# Patient Record
Sex: FEMALE | Race: WHITE | HISPANIC OR LATINO | Employment: FULL TIME | URBAN - METROPOLITAN AREA
[De-identification: names, ages, dates, MRNs, and addresses within clinical notes are randomized per-mention and may not be internally consistent; named-entity substitution may affect disease eponyms.]

---

## 2018-09-05 ENCOUNTER — OFFICE VISIT (OUTPATIENT)
Dept: MEDICAL GROUP | Facility: MEDICAL CENTER | Age: 38
End: 2018-09-05
Payer: COMMERCIAL

## 2018-09-05 ENCOUNTER — HOSPITAL ENCOUNTER (OUTPATIENT)
Facility: MEDICAL CENTER | Age: 38
End: 2018-09-05
Attending: PHYSICIAN ASSISTANT
Payer: COMMERCIAL

## 2018-09-05 VITALS
HEIGHT: 62 IN | DIASTOLIC BLOOD PRESSURE: 62 MMHG | SYSTOLIC BLOOD PRESSURE: 112 MMHG | BODY MASS INDEX: 25.96 KG/M2 | HEART RATE: 83 BPM | OXYGEN SATURATION: 97 % | WEIGHT: 141.09 LBS | RESPIRATION RATE: 16 BRPM | TEMPERATURE: 98.4 F

## 2018-09-05 DIAGNOSIS — N64.52 BREAST DISCHARGE: ICD-10-CM

## 2018-09-05 DIAGNOSIS — M54.50 ACUTE BILATERAL LOW BACK PAIN WITHOUT SCIATICA: ICD-10-CM

## 2018-09-05 DIAGNOSIS — N92.6 ABNORMAL MENSTRUAL PERIODS: ICD-10-CM

## 2018-09-05 DIAGNOSIS — Z00.00 WELLNESS EXAMINATION: ICD-10-CM

## 2018-09-05 DIAGNOSIS — G44.229 CHRONIC TENSION-TYPE HEADACHE, NOT INTRACTABLE: ICD-10-CM

## 2018-09-05 DIAGNOSIS — Z13.6 SCREENING FOR CARDIOVASCULAR CONDITION: ICD-10-CM

## 2018-09-05 DIAGNOSIS — Z12.4 SCREENING FOR CERVICAL CANCER: ICD-10-CM

## 2018-09-05 PROCEDURE — 87624 HPV HI-RISK TYP POOLED RSLT: CPT

## 2018-09-05 PROCEDURE — 99395 PREV VISIT EST AGE 18-39: CPT | Performed by: PHYSICIAN ASSISTANT

## 2018-09-05 PROCEDURE — 88175 CYTOPATH C/V AUTO FLUID REDO: CPT

## 2018-09-05 RX ORDER — ELECTROLYTES/DEXTROSE
SOLUTION, ORAL ORAL
COMMUNITY
End: 2020-09-23

## 2018-09-05 ASSESSMENT — PATIENT HEALTH QUESTIONNAIRE - PHQ9: CLINICAL INTERPRETATION OF PHQ2 SCORE: 0

## 2018-09-05 NOTE — PROGRESS NOTES
SUBJECTIVE: 37 y.o. female for annual routine pap and checkup.  Chief Complaint   Patient presents with   • Establish Care     PAP       , 14 and 4 y/o. Boyfriend had vasectomy. Patient not on birth control  Last Pap: 4 years ago   Contraception: none  H/O Abnormal Pap: one ASCUS, had biopsy, normal and then normal after that  Current partner: monogamous, boyfriend    Abnormal periods for 6 months. No known cause. Not really heavy or painful. No infertility.    LMP: Patient's last menstrual period was 2018.    Allergies: Patient has no known allergies.     ROS:    No pelvic pain, or dyspareunia. No vaginal discharge   No breast tenderness, mass, changes in size or contour, or abnormal cyclic discomfort. No leaking of milk but can still express it 3.5 years after she stopped nursing son  No urinary tract symptoms, no incontinence.   No abdominal pain, change in bowel habits, black or bloody stools.     No prolonged cough. No dyspnea or chest pain on exertion.    No skin lesions, concerns.   Episode 2 weeks ago of low back pain that lasted about 3 weeks, not sure of cause  Gets headaches chronically - bilat temples, about twice a week, not associated with nausea, vision change, dizziness. Had normal vision exam and eye exam this year. Does get a lot of neck tension. Works as dental assistant.    Preventive Care:      Current Outpatient Prescriptions   Medication Sig Dispense Refill   • Multiple Vitamins-Minerals (MULTIVITAMIN ADULT) Tab Take  by mouth.       No current facility-administered medications for this visit.      She  has no past medical history on file.  She  has no past surgical history on file.     Family History:   Family History   Problem Relation Age of Onset   • Diabetes Mother    • Diabetes Maternal Grandfather    • Cancer Neg Hx    • Heart Attack Neg Hx    • Heart Disease Neg Hx    • Stroke Neg Hx        Family History negative for : Breast, Colon, Lung, or female organ cancer, thyroid  "disease, CAD,osteoporosis.     OBJECTIVE:   /62   Pulse 83   Temp 36.9 °C (98.4 °F)   Resp 16   Ht 1.575 m (5' 2\")   Wt 64 kg (141 lb 1.5 oz)   LMP 07/01/2018   SpO2 97%   Breastfeeding? No   BMI 25.81 kg/m²   Body mass index is 25.81 kg/m².      HEAD AND NECK:  Ears normal.  Throat, oral cavity and tongue normal.  Neck supple. No adenopathy or masses in the neck or supraclavicular regions.  No carotid bruits. No thyromegaly. NEURO: Cranial nerves are normal. DTR's normal and symmetric.  CHEST:  Clear, good air entry, no wheezes or rales. HEART:  Regular rate and rhythm.  S1 and S2 normal.  No edema or JVD. ABDOMEN:  Soft without tenderness, guarding, mass or organomegaly.  No CVA tenderness or inguinal adenopathy. EXTREMITIES:  Extremities, reflexes and peripheral pulses are normal.  SKIN:  No rashes or suspicious skin lesions noted.     Breast Exam: Performed with instruction during examination. No axillary lymphadenopathy, no skin changes, no dominant masses. No nipple retraction  Pelvic Exam - Sure Path Pap obtained and specimen sent to lab. Normal external genitalia with no lesions. Normal vaginal mucosa with normal rugation and scant vaginal discharge. Cervix with no visible lesions. No cervical motion tenderness. Uterus is normal sized with no masses. No adnexal tenderness or enlargement appreciated.      <ASSESSMENT>  1. Wellness examination  CBC WITH DIFFERENTIAL    COMP METABOLIC PANEL    TSH WITH REFLEX TO FT4   2. Abnormal menstrual periods  LUTEINIZING HORMONE SERUM    FSH    PROLACTIN   3. Acute bilateral low back pain without sciatica  URINALYSIS,CULTURE IF INDICATED   4. Breast discharge  PROLACTIN   5. Chronic tension-type headache, not intractable  REFERRAL TO PHYSICAL THERAPY Reason for Therapy: Eval/Treat/Report   6. Screening for cardiovascular condition  LIPID PROFILE   7. Screening for cervical cancer  THINPREP PAP WITH HPV     F/u appt after labs       "

## 2018-09-05 NOTE — ASSESSMENT & PLAN NOTE
Chronic, twice a week, both sides of head, lasts a day - no associated dizziness, nausea, vision change. Ibuprofen helped. Had eyes checked 2-3 months ago. Pounding type headache. Does get neck tension

## 2018-09-05 NOTE — ASSESSMENT & PLAN NOTE
Lasted 3 weeks, better about a week ago. Low back, deep, worse when going from bending forward to sitting up straight. No injury. No new bed/mattress. Works as dental assistant. No rash, no urine symptoms, no fever. No meds tried.

## 2018-09-08 DIAGNOSIS — Z12.4 SCREENING FOR CERVICAL CANCER: ICD-10-CM

## 2018-09-08 LAB — AMBIGUOUS DTTM AMBI4: NORMAL

## 2018-09-10 LAB
CYTOLOGY REG CYTOL: NORMAL
HPV HR 12 DNA CVX QL NAA+PROBE: NEGATIVE
HPV16 DNA SPEC QL NAA+PROBE: NEGATIVE
HPV18 DNA SPEC QL NAA+PROBE: NEGATIVE
SPECIMEN SOURCE: NORMAL

## 2018-09-11 ENCOUNTER — NON-PROVIDER VISIT (OUTPATIENT)
Dept: OCCUPATIONAL MEDICINE | Facility: CLINIC | Age: 38
End: 2018-09-11

## 2018-09-11 ENCOUNTER — HOSPITAL ENCOUNTER (OUTPATIENT)
Facility: MEDICAL CENTER | Age: 38
End: 2018-09-11
Attending: PREVENTIVE MEDICINE
Payer: COMMERCIAL

## 2018-09-11 ENCOUNTER — APPOINTMENT (OUTPATIENT)
Dept: PHYSICAL THERAPY | Facility: REHABILITATION | Age: 38
End: 2018-09-11
Attending: PHYSICIAN ASSISTANT
Payer: COMMERCIAL

## 2018-09-11 DIAGNOSIS — Z02.89 VISIT FOR OCCUPATIONAL HEALTH EXAMINATION: Primary | ICD-10-CM

## 2018-09-11 DIAGNOSIS — Z02.89 VISIT FOR OCCUPATIONAL HEALTH EXAMINATION: ICD-10-CM

## 2018-09-11 PROCEDURE — 86480 TB TEST CELL IMMUN MEASURE: CPT | Performed by: PREVENTIVE MEDICINE

## 2018-09-12 ENCOUNTER — TELEPHONE (OUTPATIENT)
Dept: MEDICAL GROUP | Facility: MEDICAL CENTER | Age: 38
End: 2018-09-12

## 2018-09-12 NOTE — TELEPHONE ENCOUNTER
----- Message from Michell Pires P.A.-C. sent at 9/12/2018  1:24 PM PDT -----  Please call patient and advise labs reviewed and normal, next screening PAP in 5 years, still needs yearly annual visit. Thanks! Michell Pires PA-C

## 2018-09-13 ENCOUNTER — APPOINTMENT (OUTPATIENT)
Dept: PHYSICAL THERAPY | Facility: REHABILITATION | Age: 38
End: 2018-09-13
Attending: PHYSICIAN ASSISTANT
Payer: COMMERCIAL

## 2018-09-14 LAB
M TB TUBERC IFN-G BLD QL: NEGATIVE
M TB TUBERC IFN-G/MITOGEN IGNF BLD: -0
M TB TUBERC IGNF/MITOGEN IGNF CONTROL: 49.08 [IU]/ML
MITOGEN IGNF BCKGRD COR BLD-ACNC: 0.03 [IU]/ML

## 2018-09-17 ENCOUNTER — APPOINTMENT (OUTPATIENT)
Dept: PHYSICAL THERAPY | Facility: REHABILITATION | Age: 38
End: 2018-09-17
Attending: PHYSICIAN ASSISTANT
Payer: COMMERCIAL

## 2018-09-21 ENCOUNTER — APPOINTMENT (OUTPATIENT)
Dept: PHYSICAL THERAPY | Facility: REHABILITATION | Age: 38
End: 2018-09-21
Attending: PHYSICIAN ASSISTANT
Payer: COMMERCIAL

## 2018-09-25 ENCOUNTER — APPOINTMENT (OUTPATIENT)
Dept: PHYSICAL THERAPY | Facility: REHABILITATION | Age: 38
End: 2018-09-25
Attending: PHYSICIAN ASSISTANT
Payer: COMMERCIAL

## 2018-09-28 ENCOUNTER — APPOINTMENT (OUTPATIENT)
Dept: PHYSICAL THERAPY | Facility: REHABILITATION | Age: 38
End: 2018-09-28
Attending: PHYSICIAN ASSISTANT
Payer: COMMERCIAL

## 2018-10-02 ENCOUNTER — APPOINTMENT (OUTPATIENT)
Dept: PHYSICAL THERAPY | Facility: REHABILITATION | Age: 38
End: 2018-10-02
Attending: PHYSICIAN ASSISTANT
Payer: MEDICAID

## 2018-10-04 ENCOUNTER — APPOINTMENT (OUTPATIENT)
Dept: PHYSICAL THERAPY | Facility: REHABILITATION | Age: 38
End: 2018-10-04
Attending: PHYSICIAN ASSISTANT
Payer: MEDICAID

## 2018-10-08 ENCOUNTER — APPOINTMENT (OUTPATIENT)
Dept: PHYSICAL THERAPY | Facility: REHABILITATION | Age: 38
End: 2018-10-08
Attending: PHYSICIAN ASSISTANT
Payer: MEDICAID

## 2018-10-11 ENCOUNTER — APPOINTMENT (OUTPATIENT)
Dept: PHYSICAL THERAPY | Facility: REHABILITATION | Age: 38
End: 2018-10-11
Attending: PHYSICIAN ASSISTANT
Payer: MEDICAID

## 2018-11-05 ENCOUNTER — HOSPITAL ENCOUNTER (OUTPATIENT)
Dept: LAB | Facility: MEDICAL CENTER | Age: 38
End: 2018-11-05
Attending: PHYSICIAN ASSISTANT
Payer: COMMERCIAL

## 2018-11-05 DIAGNOSIS — Z00.00 WELLNESS EXAMINATION: ICD-10-CM

## 2018-11-05 DIAGNOSIS — Z13.6 SCREENING FOR CARDIOVASCULAR CONDITION: ICD-10-CM

## 2018-11-05 DIAGNOSIS — N92.6 ABNORMAL MENSTRUAL PERIODS: ICD-10-CM

## 2018-11-05 DIAGNOSIS — N64.52 BREAST DISCHARGE: ICD-10-CM

## 2018-11-05 DIAGNOSIS — M54.50 ACUTE BILATERAL LOW BACK PAIN WITHOUT SCIATICA: ICD-10-CM

## 2018-11-05 LAB
ALBUMIN SERPL BCP-MCNC: 4.7 G/DL (ref 3.2–4.9)
ALBUMIN/GLOB SERPL: 1.8 G/DL
ALP SERPL-CCNC: 68 U/L (ref 30–99)
ALT SERPL-CCNC: 45 U/L (ref 2–50)
ANION GAP SERPL CALC-SCNC: 9 MMOL/L (ref 0–11.9)
APPEARANCE UR: CLEAR
AST SERPL-CCNC: 30 U/L (ref 12–45)
BACTERIA #/AREA URNS HPF: ABNORMAL /HPF
BASOPHILS # BLD AUTO: 0.6 % (ref 0–1.8)
BASOPHILS # BLD: 0.05 K/UL (ref 0–0.12)
BILIRUB SERPL-MCNC: 1.2 MG/DL (ref 0.1–1.5)
BILIRUB UR QL STRIP.AUTO: NEGATIVE
BUN SERPL-MCNC: 13 MG/DL (ref 8–22)
CALCIUM SERPL-MCNC: 9.7 MG/DL (ref 8.5–10.5)
CHLORIDE SERPL-SCNC: 107 MMOL/L (ref 96–112)
CHOLEST SERPL-MCNC: 164 MG/DL (ref 100–199)
CO2 SERPL-SCNC: 24 MMOL/L (ref 20–33)
COLOR UR: YELLOW
CREAT SERPL-MCNC: 0.76 MG/DL (ref 0.5–1.4)
EOSINOPHIL # BLD AUTO: 0.08 K/UL (ref 0–0.51)
EOSINOPHIL NFR BLD: 1 % (ref 0–6.9)
EPI CELLS #/AREA URNS HPF: ABNORMAL /HPF
ERYTHROCYTE [DISTWIDTH] IN BLOOD BY AUTOMATED COUNT: 42.9 FL (ref 35.9–50)
FASTING STATUS PATIENT QL REPORTED: NORMAL
FSH SERPL-ACNC: 5.6 MIU/ML
GLOBULIN SER CALC-MCNC: 2.6 G/DL (ref 1.9–3.5)
GLUCOSE SERPL-MCNC: 89 MG/DL (ref 65–99)
GLUCOSE UR STRIP.AUTO-MCNC: NEGATIVE MG/DL
HCT VFR BLD AUTO: 42.5 % (ref 37–47)
HDLC SERPL-MCNC: 61 MG/DL
HGB BLD-MCNC: 14.3 G/DL (ref 12–16)
HYALINE CASTS #/AREA URNS LPF: ABNORMAL /LPF
IMM GRANULOCYTES # BLD AUTO: 0.02 K/UL (ref 0–0.11)
IMM GRANULOCYTES NFR BLD AUTO: 0.3 % (ref 0–0.9)
KETONES UR STRIP.AUTO-MCNC: ABNORMAL MG/DL
LDLC SERPL CALC-MCNC: 93 MG/DL
LEUKOCYTE ESTERASE UR QL STRIP.AUTO: ABNORMAL
LH SERPL-ACNC: 3 IU/L
LYMPHOCYTES # BLD AUTO: 2.16 K/UL (ref 1–4.8)
LYMPHOCYTES NFR BLD: 27.6 % (ref 22–41)
MCH RBC QN AUTO: 29.7 PG (ref 27–33)
MCHC RBC AUTO-ENTMCNC: 33.6 G/DL (ref 33.6–35)
MCV RBC AUTO: 88.2 FL (ref 81.4–97.8)
MICRO URNS: ABNORMAL
MONOCYTES # BLD AUTO: 0.47 K/UL (ref 0–0.85)
MONOCYTES NFR BLD AUTO: 6 % (ref 0–13.4)
NEUTROPHILS # BLD AUTO: 5.05 K/UL (ref 2–7.15)
NEUTROPHILS NFR BLD: 64.5 % (ref 44–72)
NITRITE UR QL STRIP.AUTO: NEGATIVE
NRBC # BLD AUTO: 0 K/UL
NRBC BLD-RTO: 0 /100 WBC
PH UR STRIP.AUTO: 6 [PH]
PLATELET # BLD AUTO: 353 K/UL (ref 164–446)
PMV BLD AUTO: 9.1 FL (ref 9–12.9)
POTASSIUM SERPL-SCNC: 4 MMOL/L (ref 3.6–5.5)
PROLACTIN SERPL-MCNC: 78.52 NG/ML (ref 2.8–26)
PROT SERPL-MCNC: 7.3 G/DL (ref 6–8.2)
PROT UR QL STRIP: NEGATIVE MG/DL
RBC # BLD AUTO: 4.82 M/UL (ref 4.2–5.4)
RBC # URNS HPF: ABNORMAL /HPF
RBC UR QL AUTO: ABNORMAL
SODIUM SERPL-SCNC: 140 MMOL/L (ref 135–145)
SP GR UR STRIP.AUTO: 1.02
TRIGL SERPL-MCNC: 48 MG/DL (ref 0–149)
TSH SERPL DL<=0.005 MIU/L-ACNC: 2.6 UIU/ML (ref 0.38–5.33)
UROBILINOGEN UR STRIP.AUTO-MCNC: 0.2 MG/DL
WBC # BLD AUTO: 7.8 K/UL (ref 4.8–10.8)
WBC #/AREA URNS HPF: ABNORMAL /HPF

## 2018-11-05 PROCEDURE — 84443 ASSAY THYROID STIM HORMONE: CPT

## 2018-11-05 PROCEDURE — 83002 ASSAY OF GONADOTROPIN (LH): CPT

## 2018-11-05 PROCEDURE — 83001 ASSAY OF GONADOTROPIN (FSH): CPT

## 2018-11-05 PROCEDURE — 80061 LIPID PANEL: CPT

## 2018-11-05 PROCEDURE — 85025 COMPLETE CBC W/AUTO DIFF WBC: CPT

## 2018-11-05 PROCEDURE — 81001 URINALYSIS AUTO W/SCOPE: CPT

## 2018-11-05 PROCEDURE — 36415 COLL VENOUS BLD VENIPUNCTURE: CPT

## 2018-11-05 PROCEDURE — 84146 ASSAY OF PROLACTIN: CPT

## 2018-11-05 PROCEDURE — 80053 COMPREHEN METABOLIC PANEL: CPT

## 2018-11-08 ENCOUNTER — SUPERVISING PHYSICIAN REVIEW (OUTPATIENT)
Dept: MEDICAL GROUP | Facility: MEDICAL CENTER | Age: 38
End: 2018-11-08

## 2018-11-08 NOTE — PROGRESS NOTES
Supervising Physician Review    Reviewed labs showing hyperprolactinemia in setting of irregular periods.     Recommend repeating serum prolactin and obtaining serum insulin-like growth factor-1 (IGF-1), serum ACTH. If prolactin remains elevated, recommend pituitary MRI and referral to endocrinology.

## 2018-11-27 ENCOUNTER — TELEPHONE (OUTPATIENT)
Dept: MEDICAL GROUP | Facility: MEDICAL CENTER | Age: 38
End: 2018-11-27

## 2018-11-27 DIAGNOSIS — E22.1 HYPERPROLACTINEMIA (HCC): ICD-10-CM

## 2018-11-27 NOTE — TELEPHONE ENCOUNTER
Phone Number Called: [phone] (home)     Message: LVM for pt. To call back for lab results.    Left Message for patient to call back: yes

## 2018-11-29 NOTE — TELEPHONE ENCOUNTER
Phone Number Called: [phone] (home)     Message: Spoke with pt and informed her of her test results and to get the labs that Michell ordered done. She verbalized her understanding.    Left Message for patient to call back: N\A

## 2019-02-21 ENCOUNTER — OCCUPATIONAL MEDICINE (OUTPATIENT)
Dept: URGENT CARE | Facility: CLINIC | Age: 39
End: 2019-02-21
Payer: COMMERCIAL

## 2019-02-21 VITALS
OXYGEN SATURATION: 97 % | BODY MASS INDEX: 26.68 KG/M2 | RESPIRATION RATE: 14 BRPM | HEART RATE: 70 BPM | HEIGHT: 62 IN | SYSTOLIC BLOOD PRESSURE: 116 MMHG | DIASTOLIC BLOOD PRESSURE: 76 MMHG | TEMPERATURE: 96.9 F | WEIGHT: 145 LBS

## 2019-02-21 DIAGNOSIS — M25.522 LEFT ELBOW PAIN: Primary | ICD-10-CM

## 2019-02-21 DIAGNOSIS — M25.552 ACUTE HIP PAIN, LEFT: ICD-10-CM
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
## 2019-02-21 PROCEDURE — 99214 OFFICE O/P EST MOD 30 MIN: CPT | Mod: 29 | Performed by: NURSE PRACTITIONER

## 2019-02-21 RX ORDER — IBUPROFEN 200 MG
600 TABLET ORAL ONCE
Status: COMPLETED | OUTPATIENT
Start: 2019-02-21 | End: 2019-02-21

## 2019-02-21 RX ADMIN — Medication 600 MG: at 09:25

## 2019-02-21 ASSESSMENT — ENCOUNTER SYMPTOMS
WEAKNESS: 0
FEVER: 0
FALLS: 1
MYALGIAS: 1
TINGLING: 0
BRUISES/BLEEDS EASILY: 0
CHILLS: 0
SENSORY CHANGE: 0

## 2019-02-21 NOTE — LETTER
"EMPLOYEE’S CLAIM FOR COMPENSATION/ REPORT OF INITIAL TREATMENT  FORM C-4    EMPLOYEE’S CLAIM - PROVIDE ALL INFORMATION REQUESTED   First Name  Yun Last Name  Tyree Birthdate                    1980                Sex  female Claim Number   Home Address  [address] Age  38 y.o. 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 (if applicable)  Walking in the parking lot going to work    How did this injury or occupational disease occur? (Be specific an answer in detail. Use additional sheet if necessary)  Going to work this morning parking lot was very slippery and I fell I landed on my left side.   If you believe that you have an occupational disease, when did you first have knowledge of the disability and it relationship to your employment?  n/a Witnesses to the Accident  N/A      Nature of Injury or Occupational Disease  Strain  Part(s) of Body Injured or Affected  Elbow (L), N/A, N/A    I certify that the above is true and correct to the best of my knowledge and that I have provided this information in order to obtain the benefits " of [place]  I hereby authorize any physician, chiropractor, surgeon, practitioner, or other person, any hospital, including [facility] or [facility], any medical service organization, any insurance company, or other institution or organization to release to each other, any medical or other information, including benefits paid or payable, pertinent to this injury or disease, except information relative to diagnosis, treatment and/or counseling for AIDS, psychological conditions, alcohol or controlled substances, for which I must give specific authorization.  A Photostat of this authorization shall be as valid as the original.     Date   Place   Employee’s Signature   THIS REPORT MUST BE COMPLETED AND MAILED WITHIN 3 WORKING DAYS OF TREATMENT   Place  [facility]  Name of [facility]   Date  2/21/2019 Diagnosis  (M25.522) Left elbow pain  (primary encounter diagnosis)  (W00.9XXA) Fall due to slipping on ice or snow, initial encounter  (M25.552) Acute hip pain, left Is there evidence the injured employee was under the influence of alcohol and/or another controlled substance at the time of accident?   Hour  8:56 AM Description of Injury or Disease  The primary encounter diagnosis was Left elbow pain. Diagnoses of Fall due to slipping on ice or snow, initial encounter and Acute hip pain, left were also pertinent to this visit. No   Treatment  May use up to Ibuprofen 600 mg every 4-6 hrs for pain/swelling. May use ice application prn for throbbing pain/swelling. Use of ace wrap to left elbow. Limit push/pull movement to 4 hrs or less with left arm. Recheck on 2/25/19.    Have you advised the patient to remain off work five days or more? No   X-Ray Findings      If Yes   From Date  To Date      From information given by the employee, together with medical evidence, can you  "directly connect this injury or occupational disease as job incurred?    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  Yes If Modified Duty, Specify any Limitations / Restrictions  No push/pull with left arm > 4 hrs, no carry/lift > 10#.   Do you know of any previous injury or disease contributing to this condition or occupational disease?                            No   Date  2/21/2019 Print Doctor’s Name CAESAR Gomez I certify the employer’s copy of  this form was mailed on:   Address  [address] Insurer’s Use Only     [facility] Zip  [address]    Provider’s Tax ID Number  [id number] Telephone  Dept: [phone]        e-ISAÍAS Verduzco   e-Signature: Dr. Silvio Boo, Medical Director Degree  APRN        ORIGINAL-TREATING PHYSICIAN OR CHIROPRACTOR    PAGE 2-INSURER/TPA    PAGE 3-EMPLOYER    PAGE 4-EMPLOYEE             Form C-4 (rev10/07)              BRIEF DESCRIPTION OF RIGHTS AND BENEFITS  (Pursuant to [place] 616[place].050)    Notice of Injury or Occupational Disease (Incident Report Form C-1): If an injury or occupational disease (OD) arises out of and in the  course of employment, you must provide written notice to your employer as soon as practicable, but no later than 7 days after the accident or  OD. Your employer shall maintain a sufficient supply of the required forms.    Claim for Compensation (Form C-4): If medical treatment is sought, the form C-4 is available at the place of initial treatment. A completed  \"Claim for Compensation\" (Form C-4) must be filed within 90 days after an accident or OD. The treating physician or chiropractor must,  within 3 working days after treatment, complete and mail to the employer, the employer's insurer and third-party , the Claim for  Compensation.    Medical Treatment: If you require medical treatment for your " on-the-job injury or OD, you may be required to select a physician or  chiropractor from a list provided by your workers’ compensation insurer, if it has contracted with an Organization for Managed Care (MCO) or  Preferred Provider Organization (PPO) or providers of health care. If your employer has not entered into a contract with an MCO or PPO, you  may select a physician or chiropractor from the Panel of Physicians and Chiropractors. Any medical costs related to your industrial injury or  OD will be paid by your insurer.    Temporary Total Disability (TTD): If your doctor has certified that you are unable to work for a period of at least 5 consecutive days, or 5  cumulative days in a 20-day period, or places restrictions on you that your employer does not accommodate, you may be entitled to TTD  compensation.    Temporary Partial Disability (TPD): If the wage you receive upon reemployment is less than the compensation for TTD to which you are  entitled, the insurer may be required to pay you TPD compensation to make up the difference. TPD can only be paid for a maximum of 24  months.    Permanent Partial Disability (PPD): When your medical condition is stable and there is an indication of a PPD as a result of your injury or  OD, within 30 days, your insurer must arrange for an evaluation by a rating physician or chiropractor to determine the degree of your PPD. The  amount of your PPD award depends on the date of injury, the results of the PPD evaluation and your age and wage.    Permanent Total Disability (PTD): If you are medically certified by a treating physician or chiropractor as permanently and totally disabled  and have been granted a PTD status by your insurer, you are entitled to receive monthly benefits not to exceed 66 2/3% of your average  monthly wage. The amount of your PTD payments is subject to reduction if you previously received a PPD award.    Vocational Rehabilitation Services: You may be  eligible for vocational rehabilitation services if you are unable to return to the job due to a  permanent physical impairment or permanent restrictions as a result of your injury or occupational disease.    Transportation and Per Tanesha Reimbursement: You may be eligible for travel expenses and per tanesha associated with medical treatment.    Reopening: You may be able to reopen your claim if your condition worsens after claim closure.    Appeal Process: If you disagree with a written determination issued by the insurer or the insurer does not respond to your request, you may  appeal to the Department of Administration, , by following the instructions contained in your determination letter. You must  appeal the determination within 70 days from the date of the determination letter at [address], [address], [place], [place]  [address], or [address] [facility] [facility] [address], [place], [place] [address]. If you disagree with the  decision, you may appeal to the  Department of Administration, . You must file your appeal within 30 days from the date of the  decision  letter at [address], [address], [place], [place] [address], or [address] [facility] [address], [address], [place], [place] [address]. If you  disagree with a decision of an , you may file a petition for judicial review with the District Court. You must do so within 30  days of the Appeal Officer’s decision. You may be represented by an  at your own expense or you may contact the [facility] for possible  representation.    [place]  for Injured Workers (NAIW): If you disagree with a  decision, you may request that NAIW represent you  without charge at an  Hearing. For information regarding denial of benefits, you may contact the [facility] at: [address], [address], [place] [address], [phone], or [address] [facility] [address],  [place] [address], [phone]    To File a Complaint with the Division: If you wish to file a complaint with the  of the Division of Industrial Relations (DIR),  please contact the Workers’ Compensation Section, [address] [place] [address], [place], [place] [address], telephone [phone], or  [address], [address], [place], [place] [address], telephone [phone].    For assistance with Workers’ Compensation Issues: you may contact the Office of the Governor Consumer Health Assistance, [address] [place] [address], [address], [place], [place] [address], Toll Free 1-305.667.6518, Web site: http://[facility].[place]., E-mail  [email or web address].                                                                                                                                                                                                                                   __________________________________________________________________                                                                   _________________                Employee Name / Signature                                                                                                                                                       Date                                                                                                                                                                                                     D-2 (rev. 10/07)

## 2019-02-21 NOTE — PROGRESS NOTES
"Subjective:      Yun Clements is a 38 y.o. female who presents with Elbow Injury ( DOI 02/21/2019  sharp pain) and Hip Injury (DOI 02/21/2019 sharp pain)      DOI 2/21/19. Slipped and fell on ice in parking lot at work approx. 3 hours ago. C/o left elbow and left hip pain. No ice or Ibuprofen for pain. No previous injury to elbow or hip. Denies secondary job. Pain level 8/10.      HPI  See above  PMH:  has no past medical history on file.  MEDS:   Current Outpatient Prescriptions:   •  Multiple Vitamins-Minerals (MULTIVITAMIN ADULT) Tab, Take  by mouth., Disp: , Rfl:   ALLERGIES: No Known Allergies  SURGHX: History reviewed. No pertinent surgical history.  SOCHX:  reports that she has never smoked. She has never used smokeless tobacco. She reports that she drinks about 0.6 oz of alcohol per week . She reports that she does not use drugs.  FH: Family history was reviewed, no pertinent findings to report    Review of Systems   Constitutional: Negative for chills, fever and malaise/fatigue.   Cardiovascular: Negative for leg swelling.   Musculoskeletal: Positive for falls, joint pain and myalgias.   Neurological: Negative for tingling, sensory change and weakness.   Endo/Heme/Allergies: Does not bruise/bleed easily.   All other systems reviewed and are negative.         Objective:     /76   Pulse 70   Temp 36.1 °C (96.9 °F) (Temporal)   Resp 14   Ht 1.575 m (5' 2\")   Wt 65.8 kg (145 lb)   SpO2 97%   BMI 26.52 kg/m²      Physical Exam   Constitutional: She is oriented to person, place, and time. Vital signs are normal. She appears well-developed and well-nourished. She is active and cooperative.  Non-toxic appearance. She does not have a sickly appearance. She does not appear ill. No distress.   HENT:   Head: Normocephalic.   Eyes: Pupils are equal, round, and reactive to light. Conjunctivae and EOM are normal.   Neck: Normal range of motion. Neck supple.   Cardiovascular: Normal rate.    "   Pulmonary/Chest: Effort normal. No accessory muscle usage. No respiratory distress.   Musculoskeletal: She exhibits tenderness. She exhibits no edema or deformity.        Left elbow: She exhibits normal range of motion, no swelling, no effusion, no deformity and no laceration. Tenderness found. Olecranon process tenderness noted.        Left hip: She exhibits normal range of motion, normal strength, no tenderness, no bony tenderness, no swelling, no crepitus and no deformity.        Legs:  Neurological: She is alert and oriented to person, place, and time. She has normal strength. She is not disoriented. No cranial nerve deficit or sensory deficit. Coordination and gait normal. GCS eye subscore is 4. GCS verbal subscore is 5. GCS motor subscore is 6.   Skin: Skin is warm and dry. She is not diaphoretic.   Psychiatric: She has a normal mood and affect. Her speech is normal and behavior is normal. Judgment and thought content normal. She is not actively hallucinating. Cognition and memory are normal. She is attentive.   Vitals reviewed.      A/o x4. Skin p/w/wd, skin sensation intact. No bruising, redness, swelling at left elbow or left hip region. TTP at olecranon bone. FROM of elbow joint. Discomfort with push/pull movement. FROM of left hip joint, equal leg strength, mild TTP at lateral aspect of hip region, no redness, bruising, redness or swelling. No antalgic gait. Ace wrap to left elbow. Ibuprofen 600 mg in clinic.     MA applied ace wrap to left elbow.  Assessment/Plan:     1. Left elbow pain    - ibuprofen (MOTRIN) tablet 600 mg; Take 3 Tabs by mouth Once.    2. Fall due to slipping on ice or snow, initial encounter    3. Acute hip pain, left    - ibuprofen (MOTRIN) tablet 600 mg; Take 3 Tabs by mouth Once.    Patient requesting Ibuprofen 600 mg to be prescribed instead of using OTC  May use up to Ibuprofen 600 mg every 4-6 hrs for pain/swelling. May use ice application prn for throbbing pain/swelling. Use  of ace wrap to left elbow. Limit push/pull movement to 4 hrs or less with left arm.   Requesting recheck on 2/25/19 instead of 2/28/19 due to her schedule.

## 2019-02-21 NOTE — LETTER
[address] [place] [address]  Phone:  [phone] - Fax:  [phone]   [facility] Progress Report and Disability Certification  Date of Service: 2/21/2019   No Show:  No  Date / Time of Next Visit: 2/25/2019 @ 10am   Claim Information   Patient Name: Yun Clements  Claim Number:     Employer: [employer]  Date of Injury: 2/21/2019     Insurer / TPA: [employer]  ID / SSN:     Occupation: Dental Assistant  Diagnosis: The primary encounter diagnosis was Left elbow pain. Diagnoses of Fall due to slipping on ice or snow, initial encounter and Acute hip pain, left were also pertinent to this visit.    Medical Information   Related to Industrial Injury?   Comments:slip and fall before work    Subjective Complaints:  DOI 2/21/19. Slipped and fell on ice in parking lot at work approx. 3 hours ago. C/o left elbow and left hip pain. No ice or Ibuprofen for pain. No previous injury to elbow or hip. Denies secondary job. Pain level 8/10.    Objective Findings: A/o x4. Skin p/w/wd, skin sensation intact. No bruising, redness, swelling at left elbow or left hip region. TTP at olecranon bone. FROM of elbow joint. Discomfort with push/pull movement. FROM of left hip joint, equal leg strength, mild TTP at lateral aspect of hip region, no redness, bruising, redness or swelling. No antalgic gait. Ace wrap to left elbow. Ibuprofen 600 mg in clinic.   Pre-Existing Condition(s):     Assessment:   Initial Visit    Status: Additional Care Required  Permanent Disability:No    Plan:      Diagnostics:      Comments:       Disability Information   Status: Released to Restricted Duty    From:  2/21/2019  Through: 2/25/2019 Restrictions are: Temporary   Physical Restrictions   Sitting:    Standing:    Stooping:    Bending:      Squatting:    Walking:    Climbing:    Pushing:  < or = to 4 hrs/day  Comments:left arm   Pulling:  < or = to 4  hrs/day  Comments:left arm Other:    Reaching Above Shoulder (L):   Reaching Above Shoulder (R):       Reaching Below Shoulder (L):    Reaching Below Shoulder (R):      Not to exceed Weight Limits   Carrying(hrs):   Weight Limit(lb): < or = to 10 pounds Lifting(hrs):   Weight  Limit(lb): < or = to 10 pounds   Comments: May use up to Ibuprofen 600 mg every 4-6 hrs for pain/swelling. May use ice application prn for throbbing pain/swelling. Use of ace wrap to left elbow. Limit push/pull movement to 4 hrs or less with left arm. Recheck on 2/25/19.    Repetitive Actions   Hands: i.e. Fine Manipulations from Grasping:     Feet: i.e. Operating Foot Controls:     Driving / Operate Machinery:     Physician Name: CAESAR Gomez Physician Signature: ISAÍAS Jennings e-Signature: Dr. Silvio Boo, Medical Director   Clinic Name / Location: [address] [address] Clinic Phone Number: Dept: [phone]   Appointment Time: 8:45 Am Visit Start Time: 8:56 AM   Check-In Time:  8:46 Am Visit Discharge Time:  9:36am   Original-Treating Physician or Chiropractor    Page 2-Insurer/TPA    Page 3-Employer    Page 4-Employee

## 2019-02-28 ENCOUNTER — OCCUPATIONAL MEDICINE (OUTPATIENT)
Dept: URGENT CARE | Facility: CLINIC | Age: 39
End: 2019-02-28
Payer: COMMERCIAL

## 2019-02-28 VITALS
WEIGHT: 145 LBS | BODY MASS INDEX: 26.68 KG/M2 | HEART RATE: 76 BPM | OXYGEN SATURATION: 96 % | TEMPERATURE: 97.4 F | RESPIRATION RATE: 16 BRPM | SYSTOLIC BLOOD PRESSURE: 100 MMHG | HEIGHT: 62 IN | DIASTOLIC BLOOD PRESSURE: 68 MMHG

## 2019-02-28 DIAGNOSIS — S70.02XD CONTUSION OF LEFT HIP, SUBSEQUENT ENCOUNTER: ICD-10-CM

## 2019-02-28 DIAGNOSIS — S16.1XXD STRAIN OF NECK MUSCLE, SUBSEQUENT ENCOUNTER: ICD-10-CM

## 2019-02-28 DIAGNOSIS — S33.5XXA SPRAIN OF LOW BACK, INITIAL ENCOUNTER: ICD-10-CM

## 2019-02-28 DIAGNOSIS — S50.02XD CONTUSION OF LEFT ELBOW, SUBSEQUENT ENCOUNTER: ICD-10-CM

## 2019-02-28 PROCEDURE — 99214 OFFICE O/P EST MOD 30 MIN: CPT | Performed by: FAMILY MEDICINE

## 2019-02-28 RX ORDER — IBUPROFEN 800 MG/1
800 TABLET ORAL EVERY 8 HOURS PRN
Qty: 20 TAB | Refills: 3 | Status: SHIPPED | OUTPATIENT
Start: 2019-02-28

## 2019-02-28 ASSESSMENT — ENCOUNTER SYMPTOMS
FALLS: 1
BACK PAIN: 1
MYALGIAS: 1
FOCAL WEAKNESS: 0
NECK PAIN: 1
SENSORY CHANGE: 0

## 2019-02-28 NOTE — LETTER
[address] [place] [address]  Phone:  [phone] - Fax:  [phone]   [facility] Progress Report and Disability Certification  Date of Service: 2/28/2019   No Show:  No  Date / Time of Next Visit: 3/20/2019 3/18/19 @ 10 Am   Claim Information   Patient Name: Yun Clements  Claim Number:     Employer: [employer]  Date of Injury: 2/21/2019     Insurer / TPA: [employer]  ID / SSN:     Occupation: Dental Assistant  Diagnosis: Diagnoses of Contusion of left elbow, subsequent encounter, Contusion of left hip, subsequent encounter, Sprain of low back, initial encounter, and Strain of neck muscle, subsequent encounter were pertinent to this visit.    Medical Information   Related to Industrial Injury? TBD   Subjective Complaints:  DOI 2/21/19. Slipped and fell on ice in parking lot at work approx. 3 hours ago. C/o left elbow and left hip pain.  * recheck 2/28/2019: Lt elbow and hip pain improving but noticed 1-2 days after initial visit had pains to neck and back.    Objective Findings:     Pre-Existing Condition(s):     Assessment:   Condition Same    Status: Additional Care Required  Permanent Disability:No    Plan: Medication    Diagnostics:      Comments:       Disability Information   Status:      From:  2/28/2019  Through: 3/20/2019 Restrictions are:     Physical Restrictions   Sitting:    Standing:    Stooping:    Bending:      Squatting:    Walking:    Climbing:    Pushing:      Pulling:    Other:    Reaching Above Shoulder (L):   Reaching Above Shoulder (R):       Reaching Below Shoulder (L):    Reaching Below Shoulder (R):      Not to exceed Weight Limits   Carrying(hrs):   Weight Limit(lb):   Lifting(hrs):   Weight  Limit(lb):     Comments: FULL DUTY TRIAL    Repetitive Actions   Hands: i.e. Fine Manipulations from Grasping:     Feet: i.e. Operating Foot Controls:     Driving / Operate Machinery:     Physician Name:  Chong Bonilla M.D. Physician Signature: CHONG Barron M.D. e-Signature: Dr. Silvio Boo, Medical Director   Clinic Name / Location: [address] [place] [address] Clinic Phone Number: Dept: [phone]   Appointment Time: 8:45 Am Visit Start Time: 8:34 AM   Check-In Time:  8:28 Am Visit Discharge Time:  8:54 AM   Original-Treating Physician or Chiropractor    Page 2-Insurer/TPA    Page 3-Employer    Page 4-Employee

## 2019-02-28 NOTE — PROGRESS NOTES
"Subjective:      Yun Clements is a 38 y.o. female who presents with Follow-Up (back injury DOI 02/18/2019 pt feeling better.)      DOI 2/21/19. Slipped and fell on ice in parking lot at work approx. 3 hours ago. C/o left elbow and left hip pain.  * recheck 2/28/2019: Lt elbow and hip pain improving but noticed 1-2 days after initial visit had pains to neck and back.      HPI    Review of Systems   Musculoskeletal: Positive for back pain, falls, joint pain, myalgias and neck pain.   Neurological: Negative for sensory change and focal weakness.   All other systems reviewed and are negative.         Objective:     /68   Pulse 76   Temp 36.3 °C (97.4 °F)   Resp 16   Ht 1.575 m (5' 2\")   Wt 65.8 kg (145 lb)   SpO2 96%   BMI 26.52 kg/m²      Physical Exam   Constitutional: She appears well-developed. No distress.   HENT:   Head: Normocephalic and atraumatic.   Cardiovascular: Regular rhythm.    No murmur heard.  Pulmonary/Chest: Effort normal. No respiratory distress.   Musculoskeletal:        Arms:       Legs:  Neurological: She is alert. She exhibits normal muscle tone.   Skin: Skin is warm.   Psychiatric: She has a normal mood and affect. Judgment normal.   Nursing note and vitals reviewed.  + TTP at marked areas             Assessment/Plan:         1. Contusion of left elbow, subsequent encounter  ibuprofen (MOTRIN) 800 MG Tab   2. Contusion of left hip, subsequent encounter  ibuprofen (MOTRIN) 800 MG Tab   3. Sprain of low back, initial encounter  ibuprofen (MOTRIN) 800 MG Tab   4. Strain of neck muscle, subsequent encounter  ibuprofen (MOTRIN) 800 MG Tab           - work relatedness is indeterminate   - full duty trial  - 3 wk recheck  - cont motrin              "

## 2019-03-18 ENCOUNTER — OCCUPATIONAL MEDICINE (OUTPATIENT)
Dept: URGENT CARE | Facility: CLINIC | Age: 39
End: 2019-03-18
Payer: COMMERCIAL

## 2019-03-18 VITALS
HEIGHT: 63 IN | HEART RATE: 64 BPM | RESPIRATION RATE: 16 BRPM | DIASTOLIC BLOOD PRESSURE: 84 MMHG | SYSTOLIC BLOOD PRESSURE: 122 MMHG | BODY MASS INDEX: 25.87 KG/M2 | WEIGHT: 146 LBS | TEMPERATURE: 98.3 F | OXYGEN SATURATION: 99 %

## 2019-03-18 DIAGNOSIS — S50.00XD CONTUSION OF ELBOW, UNSPECIFIED LATERALITY, SUBSEQUENT ENCOUNTER: ICD-10-CM

## 2019-03-18 PROCEDURE — 99213 OFFICE O/P EST LOW 20 MIN: CPT | Performed by: INTERNAL MEDICINE

## 2019-03-18 NOTE — LETTER
[address] [place] [address]  Phone:  [phone] - Fax:  [phone]   [facility] Progress Report and Disability Certification  Date of Service: 3/18/2019   No Show:  No  Date / Time of Next Visit:  MMI   Claim Information   Patient Name: Yun Clements  Claim Number:     Employer: [employer]  Date of Injury: 2/21/2019     Insurer / TPA: [employer]  ID / SSN:     Occupation: Dental Assistant  Diagnosis: There were no encounter diagnoses.    Medical Information   Related to Industrial Injury?      Subjective Complaints:  Patient stating that she is returned back to normal no specific complaints   Objective Findings: Normal exam   Pre-Existing Condition(s):     Assessment:   Condition Improved    Status: Discharged /  MMI  Permanent Disability:     Plan:      Diagnostics:      Comments:       Disability Information   Status: Released to Full Duty    From:     Through:   Restrictions are:     Physical Restrictions   Sitting:    Standing:    Stooping:    Bending:      Squatting:    Walking:    Climbing:    Pushing:      Pulling:    Other:    Reaching Above Shoulder (L):   Reaching Above Shoulder (R):       Reaching Below Shoulder (L):    Reaching Below Shoulder (R):      Not to exceed Weight Limits   Carrying(hrs):   Weight Limit(lb):   Lifting(hrs):   Weight  Limit(lb):     Comments:      Repetitive Actions   Hands: i.e. Fine Manipulations from Grasping:     Feet: i.e. Operating Foot Controls:     Driving / Operate Machinery:     Physician Name: Silvio Boo M.D. Physician Signature: SILVIO Peña M.D. e-Signature: Dr. Silvio Boo, Medical Director   Clinic Name / Location: [address] [place] [address] Clinic Phone Number: Dept: [phone]   Appointment Time: 10:00 Am Visit Start Time: 10:10 AM   Check-In Time:  9:47 Am Visit Discharge Time:  10:46 AM      Original-Treating Physician or Chiropractor    Page 2-Insurer/TPA    Page 3-Employer    Page 4-Employee

## 2019-03-18 NOTE — PROGRESS NOTES
"Yun Clements is a 38 y.o. female who presents for Elbow Injury (w/c,f/v DOI-2/21/19-left elbow)    Patient stating that she is returned back to normal no specific complaints  HPI    PMH:  has no past medical history on file.  MEDS:   Current Outpatient Prescriptions:   •  ibuprofen (MOTRIN) 800 MG Tab, Take 1 Tab by mouth every 8 hours as needed., Disp: 20 Tab, Rfl: 3  •  Multiple Vitamins-Minerals (MULTIVITAMIN ADULT) Tab, Take  by mouth., Disp: , Rfl:   ALLERGIES: No Known Allergies  SURGHX: No past surgical history on file.  SOCHX:  reports that she has never smoked. She has never used smokeless tobacco. She reports that she drinks about 0.6 oz of alcohol per week . She reports that she does not use drugs.  FH: Family history was reviewed, no pertinent findings to report    Review of Systems   All other systems reviewed and are negative.    No Known Allergies   Objective:   /84 (BP Location: Left arm, Patient Position: Sitting, BP Cuff Size: Adult)   Pulse 64   Temp 36.8 °C (98.3 °F) (Temporal)   Resp 16   Ht 1.61 m (5' 3.4\")   Wt 66.2 kg (146 lb)   SpO2 99%   BMI 25.54 kg/m²   Physical Exam   Constitutional: She is oriented to person, place, and time. She appears well-developed and well-nourished.   HENT:   Head: Normocephalic and atraumatic.   Nose: Nose normal.   Pulmonary/Chest: Effort normal. No respiratory distress.   Musculoskeletal: Normal range of motion.   Neurological: She is alert and oriented to person, place, and time.   Skin: Skin is warm and dry.   Psychiatric: She has a normal mood and affect. Her behavior is normal. Judgment and thought content normal.   Nursing note and vitals reviewed.    Normal exam   Assessment/Plan:   Assessment    1. Contusion of elbow, unspecified laterality, subsequent encounter  Differential diagnosis, natural history, supportive care, and indications for immediate follow-up discussed.    Patient back to normal.  Will release to full duty  "
agree with resident H&P.  57 year old with abd pain and recent laser lithotripsy presents with abd pain no bm.  states he is still making urine.  concern for sbo ct ordered.  labs ordered.  on reassessment patient states he has made less urine in last two days. concern for urinary retention given elevated cr.  maria placed with large amount of urine drained and pain improved.

## 2019-04-17 ENCOUNTER — HOSPITAL ENCOUNTER (OUTPATIENT)
Dept: LAB | Facility: MEDICAL CENTER | Age: 39
End: 2019-04-17
Attending: PHYSICIAN ASSISTANT
Payer: COMMERCIAL

## 2019-04-17 DIAGNOSIS — E22.1 HYPERPROLACTINEMIA (HCC): ICD-10-CM

## 2019-04-17 PROCEDURE — 84146 ASSAY OF PROLACTIN: CPT

## 2019-04-17 PROCEDURE — 36415 COLL VENOUS BLD VENIPUNCTURE: CPT

## 2019-04-17 PROCEDURE — 82024 ASSAY OF ACTH: CPT

## 2019-04-17 PROCEDURE — 83519 RIA NONANTIBODY: CPT

## 2019-04-18 LAB — PROLACTIN SERPL-MCNC: 71.52 NG/ML (ref 2.8–26)

## 2019-04-19 LAB — ACTH PLAS-MCNC: 23 PG/ML (ref 6–58)

## 2019-04-27 LAB — IGF BP1 SERPL-MCNC: <5 NG/ML (ref 5–34)

## 2019-05-01 ENCOUNTER — TELEPHONE (OUTPATIENT)
Dept: MEDICAL GROUP | Facility: MEDICAL CENTER | Age: 39
End: 2019-05-01

## 2019-05-01 DIAGNOSIS — R79.89 ELEVATED PROLACTIN LEVEL: ICD-10-CM

## 2019-05-01 NOTE — TELEPHONE ENCOUNTER
----- Message from Michell Pires P.A.-C. sent at 5/1/2019 10:23 AM PDT -----  Please call patient. Her prolactin is still elevated. I am referring her to endocrinology for further evaluation on cause and treatment. Thanks! Michell Pires PA-C

## 2019-08-13 NOTE — PROGRESS NOTES
Endocrinology Clinic Progress Note  PCP: Michell Pires P.A.-C.    HPI:  Yun Clements is a 38 y.o. old patient who comes in today for evaluation of her elevated prolactin levels.   Prolactin on 11/5/18 was elevated at 78.52 and on 4/17/19 remained elevated at 71.52   States she is having breast discharge from both breasts.   She does have irregular menstruation cycles.  She does report headaches more than usual and also fatigue more than usual.    ROS:  Constitutional: fatigue, weight gain, No weight loss  Breasts: bilateral breast discharge.  Cardiac: No palpitations or racing heart  Resp: No shortness of breath  Neuro: No numbness or tinging in feet  Endo: No heat or cold intolerance, no polyuria or polydipsia  All other systems were reviewed and were negative.    Past Medical History:  Patient Active Problem List    Diagnosis Date Noted   • Hyperprolactinemia (HCC) 11/27/2018   • Abnormal menstrual periods 09/05/2018   • Acute bilateral low back pain without sciatica 09/05/2018   • Breast discharge 09/05/2018   • Chronic tension-type headache, not intractable 09/05/2018       Past Surgical History:  History reviewed. No pertinent surgical history.    Allergies:  Patient has no known allergies.    Social History:  Social History     Socioeconomic History   • Marital status: Single     Spouse name: Not on file   • Number of children: Not on file   • Years of education: Not on file   • Highest education level: Not on file   Occupational History   • Not on file   Social Needs   • Financial resource strain: Not on file   • Food insecurity:     Worry: Not on file     Inability: Not on file   • Transportation needs:     Medical: Not on file     Non-medical: Not on file   Tobacco Use   • Smoking status: Never Smoker   • Smokeless tobacco: Never Used   Substance and Sexual Activity   • Alcohol use: Yes     Alcohol/week: 0.6 oz     Types: 1 Glasses of wine per week     Comment: rarely, once a month   • Drug  "use: No   • Sexual activity: Yes     Partners: Male     Birth control/protection: Condom   Lifestyle   • Physical activity:     Days per week: Not on file     Minutes per session: Not on file   • Stress: Not on file   Relationships   • Social connections:     Talks on phone: Not on file     Gets together: Not on file     Attends Hindu service: Not on file     Active member of club or organization: Not on file     Attends meetings of clubs or organizations: Not on file     Relationship status: Not on file   • Intimate partner violence:     Fear of current or ex partner: Not on file     Emotionally abused: Not on file     Physically abused: Not on file     Forced sexual activity: Not on file   Other Topics Concern   • Not on file   Social History Narrative   • Not on file       Family History:  Family History   Problem Relation Age of Onset   • Diabetes Mother    • Diabetes Maternal Grandfather    • Cancer Neg Hx    • Heart Attack Neg Hx    • Heart Disease Neg Hx    • Stroke Neg Hx        Medications:    Current Outpatient Medications:   •  cabergoline (DOSTINEX) 0.5 MG tablet, 1 tab twice a week., Disp: 24 Tab, Rfl: 3  •  ibuprofen (MOTRIN) 800 MG Tab, Take 1 Tab by mouth every 8 hours as needed., Disp: 20 Tab, Rfl: 3  •  Multiple Vitamins-Minerals (MULTIVITAMIN ADULT) Tab, Take  by mouth., Disp: , Rfl:     Labs: Reviewed    Physical Examination:  Vital signs: /78 (BP Location: Left arm, Patient Position: Sitting, BP Cuff Size: Adult)   Pulse 74   Ht 1.575 m (5' 2\")   Wt 68.6 kg (151 lb 3.2 oz)   SpO2 96%   BMI 27.65 kg/m²  Body mass index is 27.65 kg/m².  General: No apparent distress, cooperative  Eyes: No scleral icterus or discharge  ENMT: Normal on external inspection of nose, lips, normal thyroid exam  Breasts: bilateral milky discharge on nipple stimulation; no palpable lumps or masses; no blood stained or greenish discharge; female chaperone Zaynab Lobato RN,CDE was present throughout the " entire examination . Prior verbal consent from patient was obtained and she was comfortable with Zaynab as the female chaperone for breast exam.  Neck: No abnormal masses on inspection  Resp: Normal effort, clear to auscultation bilaterally   CVS: Regular rate and rhythm, S1 S2 normal, no murmur   Extremities: No edema  Abdomen: abdominal obesity present  Neuro: Alert and oriented  Skin: No rash  Psych: Normal mood and affect, intact memory and able to make informed decisions    Assessment and Plan:  1. Hyperprolactinemia (HCC)  Rule out micro-and macro prolactinoma of the pituitary contributing to hyperprolactinemia.  Once the MRI of the pituitary is done she can start taking the cabergoline.    2. Fatigue:   Rule out Vitamin D deficiency as the contributory factor for fatigue.   Rule out Vitamin B12 deficiency as the contributory factor for fatigue.  Rule out hypothyroidism as the contributory factor for fatigue and weight gain.     Return in about 3 months (around 11/14/2019).    Thank you for allowing me to participate in the care of this patient.    Eduin Delgado M.D.  08/13/19    CC:   Michell Pires P.A.-C.    This note was created using voice recognition software (Dragon). The accuracy of the dictation is limited by the abilities of the software. I have reviewed the note prior to signing, however some errors in grammar and context are still possible. If you have any questions related to this note please do not hesitate to contact our office.   This note was scribed by Zaynab Lobato RN, CDE

## 2019-08-14 ENCOUNTER — OFFICE VISIT (OUTPATIENT)
Dept: ENDOCRINOLOGY | Facility: MEDICAL CENTER | Age: 39
End: 2019-08-14
Payer: COMMERCIAL

## 2019-08-14 VITALS
BODY MASS INDEX: 27.82 KG/M2 | HEART RATE: 74 BPM | OXYGEN SATURATION: 96 % | WEIGHT: 151.2 LBS | SYSTOLIC BLOOD PRESSURE: 108 MMHG | HEIGHT: 62 IN | DIASTOLIC BLOOD PRESSURE: 78 MMHG

## 2019-08-14 DIAGNOSIS — E22.1 HYPERPROLACTINEMIA (HCC): ICD-10-CM

## 2019-08-14 DIAGNOSIS — E53.8 VITAMIN B 12 DEFICIENCY: ICD-10-CM

## 2019-08-14 DIAGNOSIS — E55.9 VITAMIN D DEFICIENCY: ICD-10-CM

## 2019-08-14 DIAGNOSIS — E03.9 HYPOTHYROIDISM, UNSPECIFIED TYPE: ICD-10-CM

## 2019-08-14 DIAGNOSIS — E27.1 ADRENAL INSUFFICIENCY (ADDISON'S DISEASE) (HCC): ICD-10-CM

## 2019-08-14 DIAGNOSIS — N92.6 MENSTRUAL PERIODS IRREGULAR: ICD-10-CM

## 2019-08-14 DIAGNOSIS — R53.83 FATIGUE, UNSPECIFIED TYPE: ICD-10-CM

## 2019-08-14 PROCEDURE — 99204 OFFICE O/P NEW MOD 45 MIN: CPT | Performed by: INTERNAL MEDICINE

## 2019-08-14 RX ORDER — CABERGOLINE 0.5 MG/1
TABLET ORAL
Qty: 24 TAB | Refills: 3 | Status: SHIPPED | OUTPATIENT
Start: 2019-08-14 | End: 2020-08-25

## 2019-08-20 ENCOUNTER — TELEPHONE (OUTPATIENT)
Dept: ENDOCRINOLOGY | Facility: MEDICAL CENTER | Age: 39
End: 2019-08-20

## 2019-08-20 ENCOUNTER — HOSPITAL ENCOUNTER (OUTPATIENT)
Dept: RADIOLOGY | Facility: MEDICAL CENTER | Age: 39
End: 2019-08-20
Attending: INTERNAL MEDICINE
Payer: COMMERCIAL

## 2019-08-20 DIAGNOSIS — E22.1 HYPERPROLACTINEMIA (HCC): ICD-10-CM

## 2019-08-20 PROCEDURE — 70553 MRI BRAIN STEM W/O & W/DYE: CPT

## 2019-08-20 PROCEDURE — A9585 GADOBUTROL INJECTION: HCPCS | Performed by: INTERNAL MEDICINE

## 2019-08-20 PROCEDURE — 700117 HCHG RX CONTRAST REV CODE 255: Performed by: INTERNAL MEDICINE

## 2019-08-20 RX ORDER — GADOBUTROL 604.72 MG/ML
7 INJECTION INTRAVENOUS ONCE
Status: COMPLETED | OUTPATIENT
Start: 2019-08-20 | End: 2019-08-20

## 2019-08-20 RX ADMIN — GADOBUTROL 7 ML: 604.72 INJECTION INTRAVENOUS at 14:15

## 2019-08-20 NOTE — TELEPHONE ENCOUNTER
Phone Number Called:[phone] (home)         Message: LVM for patient to call back or write to us via [employer] a detailed message with the medication name and what her question is.      Left Message for patient to call back: yes

## 2019-08-20 NOTE — TELEPHONE ENCOUNTER
VOICEMAIL    1. Caller Name: Yun Clements                          Call Back Number: [phone] (home)       2. Message: Patient left a voicemail asking for a call back in regards to one of her medications.    3. Patient approves office to leave a detailed voicemail/MyChart message: yes

## 2019-09-24 ENCOUNTER — NON-PROVIDER VISIT (OUTPATIENT)
Dept: OCCUPATIONAL MEDICINE | Facility: CLINIC | Age: 39
End: 2019-09-24

## 2019-09-24 ENCOUNTER — HOSPITAL ENCOUNTER (OUTPATIENT)
Facility: MEDICAL CENTER | Age: 39
End: 2019-09-24
Attending: PREVENTIVE MEDICINE
Payer: COMMERCIAL

## 2019-09-24 DIAGNOSIS — Z02.89 ENCOUNTER FOR OCCUPATIONAL HEALTH ASSESSMENT: ICD-10-CM

## 2019-09-24 PROCEDURE — 86480 TB TEST CELL IMMUN MEASURE: CPT | Performed by: PREVENTIVE MEDICINE

## 2019-09-25 LAB
GAMMA INTERFERON BACKGROUND BLD IA-ACNC: 0.03 IU/ML
M TB IFN-G BLD-IMP: NEGATIVE
M TB IFN-G CD4+ BCKGRND COR BLD-ACNC: 0 IU/ML
MITOGEN IGNF BCKGRD COR BLD-ACNC: >10 IU/ML
QFT TB2 - NIL TBQ2: 0 IU/ML

## 2019-10-23 ENCOUNTER — NON-PROVIDER VISIT (OUTPATIENT)
Dept: OCCUPATIONAL MEDICINE | Facility: CLINIC | Age: 39
End: 2019-10-23

## 2019-10-23 DIAGNOSIS — Z02.1 PRE-EMPLOYMENT DRUG SCREENING: ICD-10-CM

## 2019-10-23 LAB
AMP AMPHETAMINE: NORMAL
COC COCAINE: NORMAL
INT CON NEG: NORMAL
INT CON POS: NORMAL
MET METHAMPHETAMINES: NORMAL
OPI OPIATES: NORMAL
PCP PHENCYCLIDINE: NORMAL
POC DRUG COMMENT 753798-OCCUPATIONAL HEALTH: NEGATIVE
THC: NORMAL

## 2019-10-23 PROCEDURE — 80305 DRUG TEST PRSMV DIR OPT OBS: CPT | Performed by: PREVENTIVE MEDICINE

## 2019-11-07 ENCOUNTER — HOSPITAL ENCOUNTER (OUTPATIENT)
Dept: LAB | Facility: MEDICAL CENTER | Age: 39
End: 2019-11-07
Attending: INTERNAL MEDICINE
Payer: COMMERCIAL

## 2019-11-07 DIAGNOSIS — E22.1 HYPERPROLACTINEMIA (HCC): ICD-10-CM

## 2019-11-07 DIAGNOSIS — E27.1 ADRENAL INSUFFICIENCY (ADDISON'S DISEASE) (HCC): ICD-10-CM

## 2019-11-07 DIAGNOSIS — E03.9 HYPOTHYROIDISM, UNSPECIFIED TYPE: ICD-10-CM

## 2019-11-07 LAB
CORTIS SERPL-MCNC: 10.7 UG/DL (ref 0–23)
PROLACTIN SERPL-MCNC: 3.9 NG/ML (ref 2.8–26)
T3 SERPL-MCNC: 98.1 NG/DL (ref 60–181)
T3FREE SERPL-MCNC: 2.97 PG/ML (ref 2.4–4.2)
T4 FREE SERPL-MCNC: 0.77 NG/DL (ref 0.53–1.43)
THYROPEROXIDASE AB SERPL-ACNC: 484.3 IU/ML (ref 0–9)
TSH SERPL DL<=0.005 MIU/L-ACNC: 5.85 UIU/ML (ref 0.38–5.33)

## 2019-11-07 PROCEDURE — 84443 ASSAY THYROID STIM HORMONE: CPT

## 2019-11-07 PROCEDURE — 36415 COLL VENOUS BLD VENIPUNCTURE: CPT

## 2019-11-07 PROCEDURE — 84480 ASSAY TRIIODOTHYRONINE (T3): CPT

## 2019-11-07 PROCEDURE — 84439 ASSAY OF FREE THYROXINE: CPT

## 2019-11-07 PROCEDURE — 82024 ASSAY OF ACTH: CPT

## 2019-11-07 PROCEDURE — 86376 MICROSOMAL ANTIBODY EACH: CPT

## 2019-11-07 PROCEDURE — 84146 ASSAY OF PROLACTIN: CPT

## 2019-11-07 PROCEDURE — 84481 FREE ASSAY (FT-3): CPT

## 2019-11-07 PROCEDURE — 82533 TOTAL CORTISOL: CPT

## 2019-11-09 LAB — ACTH PLAS-MCNC: 20 PG/ML (ref 7.2–63.3)

## 2019-11-16 NOTE — PROGRESS NOTES
Endocrinology Clinic Progress Note  PCP: Michell Pires P.A.-C.    HPI:  Faby Clements is a 38 y.o. old patient who comes in today for review of endocrine problems.    Hyperprolactinemia:  Currently taking Cabergoline .5 mg twice weekly.  States breast discharge completely stopped with the second dose of Cabergoline. Results for FABY CLEMENTS (MRN [id number]) as of 11/16/2019 07:16   Ref. Range 11/7/2019 06:29   Prolactin Latest Ref Range: 2.80 - 26.00 ng/mL 3.90     Hypothyroidism:  Currently not taking any thyroid medications. She has noticed her hair falling out more. Results for FABY CLEMENTS (MRN [id number]) as of 11/16/2019 07:16   Ref. Range 11/7/2019 06:29   TSH Latest Ref Range: 0.380 - 5.330 uIU/mL 5.850 (H)   Free T-4 Latest Ref Range: 0.53 - 1.43 ng/dL 0.77   T3 Latest Ref Range: 60.0 - 181.0 ng/dL 98.1   T3,Free Latest Ref Range: 2.40 - 4.20 pg/mL 2.97   Results for FABY CLEMENTS (MRN [id number]) as of 11/16/2019 07:16   Ref. Range 11/7/2019 06:29   Microsomal -Tpo- Abs Latest Ref Range: 0.0 - 9.0 IU/mL 484.3 (H)     Fatigue:   She states she is tired from working 2 jobs.  ROS:  Constitutional: fatigue, No unintentional weight loss  Skin/Integumentary: hair loss and thinning  Endo: Denies excessive thirst or frequent urination  All other systems were reviewed and were negative.    Past Medical History:  Patient Active Problem List    Diagnosis Date Noted   • Hyperprolactinemia (HCC) 11/27/2018   • Abnormal menstrual periods 09/05/2018   • Acute bilateral low back pain without sciatica 09/05/2018   • Breast discharge 09/05/2018   • Chronic tension-type headache, not intractable 09/05/2018       Medications:    Current Outpatient Medications:   •  cabergoline (DOSTINEX) 0.5 MG tablet, 1 tab twice a week., Disp: 24 Tab, Rfl: 3  •  ibuprofen (MOTRIN) 800 MG Tab, Take 1 Tab by mouth every 8 hours as needed., Disp: 20 Tab, Rfl: 3  •  Multiple Vitamins-Minerals (MULTIVITAMIN ADULT)  "Tab, Take  by mouth., Disp: , Rfl:     Labs: Reviewed    Physical Examination:  Vital signs: /70   Pulse 79   Ht 1.575 m (5' 2\")   Wt 66.2 kg (146 lb)   SpO2 99%   BMI 26.70 kg/m²  Body mass index is 26.7 kg/m². Patient's body mass index is 26.7 kg/m². Exercise and nutrition counseling were performed at this visit.  Running 2 to 3 days/week.  General: No apparent distress, cooperative  Eyes: No scleral icterus, no discharge, normal eyelids  Neck: No abnormal masses on inspection, normal thyroid exam  Resp: Normal effort, clear to auscultation bilaterally  CVS: Regular rate and rhythm, S1 S2 normal, no murmur  Extremities: No lower extremity edema  Abdomen: abdominal obesity present  Musculoskeletal: Normal digits and nails  Skin: No rash on visible skin  Psych: Alert and oriented, normal mood and affect, intact memory and able to make informed decisions.    Assessment and Plan:    1. Hyperprolactinemia (HCC)  Normal on cabergoline; continue for now; MRI of pituitary : normal.    2. Hypothyroidism, unspecified type  She does have hypothyroidism secondary to hashimoto's thyroiditis; will have to obtain another TSH above 2.5 before starting thyroid hormone replacement; patient wants to try eating more healthy, practice some stress relief exercises and see if can bring the TSH down in 3-4 months; I am okay with it; if TSH is still above 2.5 in that time frame; she is agreeable to start thyroid hormone replacement; benefits and risks were discussed in detail.    3. Fatigue, unspecified type  Could be due to vit d and B 12 def and working two jobs and stress related to it. Will check levels.     Return in about 4 months (around 3/18/2020).    Thank you for allowing me to participate in the care of this patient.    Eduin Delgado M.D.  This note was scribed by Ruba Garcia RN CDE  CC:   Michell Pires P.A.-C.    This note was created using voice recognition software (Dragon). The accuracy of the dictation is " limited by the abilities of the software. I have reviewed the note prior to signing, however some errors in grammar and context are still possible. If you have any questions related to this note please do not hesitate to contact our office.

## 2019-11-18 ENCOUNTER — OFFICE VISIT (OUTPATIENT)
Dept: ENDOCRINOLOGY | Facility: MEDICAL CENTER | Age: 39
End: 2019-11-18
Payer: COMMERCIAL

## 2019-11-18 VITALS
BODY MASS INDEX: 26.87 KG/M2 | SYSTOLIC BLOOD PRESSURE: 110 MMHG | DIASTOLIC BLOOD PRESSURE: 70 MMHG | HEIGHT: 62 IN | OXYGEN SATURATION: 99 % | WEIGHT: 146 LBS | HEART RATE: 79 BPM

## 2019-11-18 DIAGNOSIS — E53.8 VITAMIN B 12 DEFICIENCY: ICD-10-CM

## 2019-11-18 DIAGNOSIS — E55.9 VITAMIN D DEFICIENCY: ICD-10-CM

## 2019-11-18 DIAGNOSIS — R53.83 FATIGUE, UNSPECIFIED TYPE: ICD-10-CM

## 2019-11-18 DIAGNOSIS — E22.1 HYPERPROLACTINEMIA (HCC): ICD-10-CM

## 2019-11-18 DIAGNOSIS — E03.9 HYPOTHYROIDISM, UNSPECIFIED TYPE: ICD-10-CM

## 2019-11-18 PROCEDURE — 99214 OFFICE O/P EST MOD 30 MIN: CPT | Performed by: INTERNAL MEDICINE

## 2020-03-03 ENCOUNTER — HOSPITAL ENCOUNTER (OUTPATIENT)
Dept: LAB | Facility: MEDICAL CENTER | Age: 40
End: 2020-03-03
Attending: INTERNAL MEDICINE
Payer: COMMERCIAL

## 2020-03-03 DIAGNOSIS — E03.9 HYPOTHYROIDISM, UNSPECIFIED TYPE: ICD-10-CM

## 2020-03-03 DIAGNOSIS — E53.8 VITAMIN B 12 DEFICIENCY: ICD-10-CM

## 2020-03-03 DIAGNOSIS — E55.9 VITAMIN D DEFICIENCY: ICD-10-CM

## 2020-03-03 DIAGNOSIS — E22.1 HYPERPROLACTINEMIA (HCC): ICD-10-CM

## 2020-03-03 LAB
25(OH)D3 SERPL-MCNC: 10 NG/ML (ref 30–100)
PROLACTIN SERPL-MCNC: 3.58 NG/ML (ref 2.8–26)
T3 SERPL-MCNC: 121.6 NG/DL (ref 60–181)
T3FREE SERPL-MCNC: 3.15 PG/ML (ref 2.4–4.2)
T4 FREE SERPL-MCNC: 0.95 NG/DL (ref 0.53–1.43)
THYROPEROXIDASE AB SERPL-ACNC: 54.7 IU/ML (ref 0–9)
TSH SERPL DL<=0.005 MIU/L-ACNC: 4.37 UIU/ML (ref 0.38–5.33)
VIT B12 SERPL-MCNC: >1500 PG/ML (ref 211–911)

## 2020-03-03 PROCEDURE — 84146 ASSAY OF PROLACTIN: CPT

## 2020-03-03 PROCEDURE — 36415 COLL VENOUS BLD VENIPUNCTURE: CPT

## 2020-03-03 PROCEDURE — 82306 VITAMIN D 25 HYDROXY: CPT

## 2020-03-03 PROCEDURE — 86376 MICROSOMAL ANTIBODY EACH: CPT

## 2020-03-03 PROCEDURE — 84439 ASSAY OF FREE THYROXINE: CPT

## 2020-03-03 PROCEDURE — 84480 ASSAY TRIIODOTHYRONINE (T3): CPT

## 2020-03-03 PROCEDURE — 82607 VITAMIN B-12: CPT

## 2020-03-03 PROCEDURE — 84443 ASSAY THYROID STIM HORMONE: CPT

## 2020-03-03 PROCEDURE — 84481 FREE ASSAY (FT-3): CPT

## 2020-03-07 NOTE — PROGRESS NOTES
Endocrinology Clinic Progress Note  PCP: Michell Pires P.A.-C.    HPI:  Faby Clements is a 39 y.o. old patient who comes in today for review of endocrine problems.    Hyperprolactinemia:  Currently taking Cabergoline .5 mg twice weekly.  She denies any further breast discharge.  Inspiration cycles are also very regular now.    Results for FABY CLEMENTS (MRN [id number]) as of 3/7/2020 15:34   Ref. Range 3/3/2020 06:26   Prolactin Latest Ref Range: 2.80 - 26.00 ng/mL 3.58     Hypothyroidism:  She is not currently taking any thyroid medication.  She denies fatigue, weight gain or other problems except had been losing a lot of hair.  She did start a hair, skin, and nail supplement with biotin in it about 3 months ago.  Results for FABY CLEMENTS (MRN [id number]) as of 3/7/2020 15:34   Ref. Range 11/7/2019 06:29 3/3/2020 06:26   TSH Latest Ref Range: 0.380 - 5.330 uIU/mL 5.850 (H) 4.370   Free T-4 Latest Ref Range: 0.53 - 1.43 ng/dL 0.77 0.95   T3 Latest Ref Range: 60.0 - 181.0 ng/dL 98.1 121.6   T3,Free Latest Ref Range: 2.40 - 4.20 pg/mL 2.97 3.15      Ref. Range 11/7/2019 06:29 3/3/2020 06:26   Microsomal -Tpo- Abs Latest Ref Range: 0.0 - 9.0 IU/mL 484.3 (H) 54.7 (H)     Vitamin D Deficiency:  She is not currently taking Vitamin D.  Results for FABY CLEMENTS (MRN [id number]) as of 3/7/2020 15:34   Ref. Range 3/3/2020 06:26   25-Hydroxy   Vitamin D 25 Latest Ref Range: 30 - 100 ng/mL 10 (L)     ROS:  Constitutional:hair  Loss, no unintentional weight loss  Endo: Denies excessive thirst or frequent urination  All other systems were reviewed and were negative.    Past Medical History:  Patient Active Problem List    Diagnosis Date Noted   • Hyperprolactinemia (HCC) 11/27/2018   • Abnormal menstrual periods 09/05/2018   • Acute bilateral low back pain without sciatica 09/05/2018   • Breast discharge 09/05/2018   • Chronic tension-type headache, not intractable 09/05/2018  "      Medications:    Current Outpatient Medications:   •  Biotin 10 MG Cap, Take  by mouth. Using Hair, Skin, and Nail multivitamin, Disp: , Rfl:   •  levothyroxine (SYNTHROID) 75 MCG Tab, Take 1 Tab by mouth Every morning on an empty stomach. Synthroid brand medically necessary., Disp: 90 Tab, Rfl: 3  •  vitamin D, Ergocalciferol, (DRISDOL) 1.25 MG (69589 UT) Cap capsule, Take 1 Cap by mouth every 7 days. With food., Disp: 12 Cap, Rfl: 3  •  cabergoline (DOSTINEX) 0.5 MG tablet, 1 tab twice a week., Disp: 24 Tab, Rfl: 3  •  ibuprofen (MOTRIN) 800 MG Tab, Take 1 Tab by mouth every 8 hours as needed., Disp: 20 Tab, Rfl: 3  •  Multiple Vitamins-Minerals (MULTIVITAMIN ADULT) Tab, Take  by mouth., Disp: , Rfl:     Labs: Reviewed    Physical Examination:  Vital signs: /70   Pulse 72   Ht 1.575 m (5' 2\")   Wt 66.7 kg (147 lb)   SpO2 95%   BMI 26.89 kg/m²  Body mass index is 26.89 kg/m². Patient's body mass index is 26.89 kg/m². General: No apparent distress, cooperative  Eyes: No scleral icterus or discharge  ENMT: Normal on external inspection of nose, lips, normal thyroid exam  Neck: No abnormal masses on inspection  Resp: Normal effort, clear to auscultation bilaterally   CVS: Regular rate and rhythm, S1 S2 normal, no murmur   Extremities: No edema  Neuro: Alert and oriented  Skin: No rash  Psych: Normal mood and affect, intact memory and able to make informed decisions      Assessment and Plan:    1. Hyperprolactinemia (HCC)  On cabergoline therapy.  Continue.    2. Hypothyroidism due to Hashimoto's thyroiditis  Does have Hashimoto's thyroiditis with 2 TSH above 2.5.  She will need thyroid hormone replacement.  Side effects and benefits discussed with patient in great detail.  Start Synthroid 75 mcg daily.  She would prefer the brand for now.    3. Vitamin D deficiency  start  - vitamin D, Ergocalciferol, (DRISDOL) 1.25 MG (28335 UT) Cap capsule; Take 1 Cap by mouth every 7 days. With food.  Dispense: 12 " Cap; Refill: 3    Return in about 3 months (around 6/9/2020).    Thank you for allowing me to participate in the care of this patient.      CC:   Michell Pires P.A.-C.    This note was created using voice recognition software (Dragon). The accuracy of the dictation is limited by the abilities of the software. I have reviewed the note prior to signing, however some errors in grammar and context are still possible. If you have any questions related to this note please do not hesitate to contact our office.

## 2020-03-09 ENCOUNTER — OFFICE VISIT (OUTPATIENT)
Dept: ENDOCRINOLOGY | Facility: MEDICAL CENTER | Age: 40
End: 2020-03-09
Payer: COMMERCIAL

## 2020-03-09 VITALS
OXYGEN SATURATION: 95 % | WEIGHT: 147 LBS | BODY MASS INDEX: 27.05 KG/M2 | SYSTOLIC BLOOD PRESSURE: 110 MMHG | HEART RATE: 72 BPM | DIASTOLIC BLOOD PRESSURE: 70 MMHG | HEIGHT: 62 IN

## 2020-03-09 DIAGNOSIS — E03.8 HYPOTHYROIDISM DUE TO HASHIMOTO'S THYROIDITIS: ICD-10-CM

## 2020-03-09 DIAGNOSIS — E06.3 HYPOTHYROIDISM DUE TO HASHIMOTO'S THYROIDITIS: ICD-10-CM

## 2020-03-09 DIAGNOSIS — E22.1 HYPERPROLACTINEMIA (HCC): ICD-10-CM

## 2020-03-09 DIAGNOSIS — E55.9 VITAMIN D DEFICIENCY: ICD-10-CM

## 2020-03-09 PROCEDURE — 99214 OFFICE O/P EST MOD 30 MIN: CPT | Performed by: INTERNAL MEDICINE

## 2020-03-09 RX ORDER — LEVOTHYROXINE SODIUM 0.07 MG/1
75 TABLET ORAL
Qty: 90 TAB | Refills: 3 | Status: SHIPPED | OUTPATIENT
Start: 2020-03-09 | End: 2020-09-23

## 2020-03-09 RX ORDER — BIOTIN 10000 MCG
CAPSULE ORAL
COMMUNITY
End: 2020-09-23

## 2020-03-09 RX ORDER — ERGOCALCIFEROL 1.25 MG/1
50000 CAPSULE ORAL
Qty: 12 CAP | Refills: 3 | Status: SHIPPED | OUTPATIENT
Start: 2020-03-09 | End: 2020-09-23 | Stop reason: SDUPTHER

## 2020-03-09 ASSESSMENT — FIBROSIS 4 INDEX: FIB4 SCORE: 0.48

## 2020-06-21 NOTE — PROGRESS NOTES
Endocrinology Clinic Progress Note  PCP: Michell Pires P.A.-C.    HPI:  This encounter was conducted via Zoom.  Verbal consent was obtained. Patient's identity was verified.  Faby Clements is a 39 y.o. old patient who comes in today for review of endocrine problems.    Hyperprolactinemia:  Currently taking Cabergoline .5 mg twice weekly.  She denies any further breast discharge.  Results for FABY CLEMENTS (MRN [id number]) as of 6/20/2020 21:58   Ref. Range 3/3/2020 06:26   Prolactin Latest Ref Range: 2.80 - 26.00 ng/mL 3.58     Hypothyroidism:  Currently taking Synthroid 75 mcg daily.  Results for FABY CLEMENTS (MRN [id number]) as of 6/20/2020 21:58   Ref. Range 3/3/2020 06:26   TSH Latest Ref Range: 0.380 - 5.330 uIU/mL 4.370   Free T-4 Latest Ref Range: 0.53 - 1.43 ng/dL 0.95   T3 Latest Ref Range: 60.0 - 181.0 ng/dL 121.6   T3,Free Latest Ref Range: 2.40 - 4.20 pg/mL 3.15      Ref. Range 3/3/2020 06:26   Microsomal -Tpo- Abs Latest Ref Range: 0.0 - 9.0 IU/mL 54.7 (H)     Vitamin D Deficiency:  Currently taking Vitamin D 50,000 units weekly.      ROS:  Constitutional: No unintentional weight loss  Endo: Denies excessive thirst or frequent urination  All other systems were reviewed and were negative.    Past Medical History:  Patient Active Problem List    Diagnosis Date Noted   • Hyperprolactinemia (HCC) 11/27/2018   • Abnormal menstrual periods 09/05/2018   • Acute bilateral low back pain without sciatica 09/05/2018   • Breast discharge 09/05/2018   • Chronic tension-type headache, not intractable 09/05/2018       Medications:    Current Outpatient Medications:   •  Biotin 10 MG Cap, Take  by mouth. Using Hair, Skin, and Nail multivitamin, Disp: , Rfl:   •  levothyroxine (SYNTHROID) 75 MCG Tab, Take 1 Tab by mouth Every morning on an empty stomach. Synthroid brand medically necessary., Disp: 90 Tab, Rfl: 3  •  vitamin D, Ergocalciferol, (DRISDOL) 1.25 MG (97744 UT) Cap capsule, Take 1 Cap  by mouth every 7 days. With food., Disp: 12 Cap, Rfl: 3  •  cabergoline (DOSTINEX) 0.5 MG tablet, 1 tab twice a week., Disp: 24 Tab, Rfl: 3  •  ibuprofen (MOTRIN) 800 MG Tab, Take 1 Tab by mouth every 8 hours as needed., Disp: 20 Tab, Rfl: 3  •  Multiple Vitamins-Minerals (MULTIVITAMIN ADULT) Tab, Take  by mouth., Disp: , Rfl:     Labs: Reviewed    Physical Examination:  Vital signs: There were no vitals taken for this visit. There is no height or weight on file to calculate BMI.  General: No apparent distress, cooperative  Eyes: No scleral icterus, no discharge, normal eyelids  Neck: No abnormal masses on inspection, normal thyroid exam  Resp: Normal effort, clear to auscultation bilaterally  CVS: Regular rate and rhythm, S1 S2 normal, no murmur  Extremities: No lower extremity edema  Abdomen: abdominal obesity present  Musculoskeletal: Normal digits and nails  Skin: No rash on visible skin  Psych: Alert and oriented, normal mood and affect, intact memory and able to make informed decisions.    Assessment and Plan:    1. Hyperprolactinemia   ***    2. Hypothyroidism due to Hashimoto's thyroiditis  ***    3. Vitamin D deficiency  ***      No follow-ups on file.    Thank you for allowing me to participate in the care of this patient.        CC:   Michell Pires P.A.-C.    This note was created using voice recognition software (Dragon). The accuracy of the dictation is limited by the abilities of the software. I have reviewed the note prior to signing, however some errors in grammar and context are still possible. If you have any questions related to this note please do not hesitate to contact our office.

## 2020-06-23 ENCOUNTER — APPOINTMENT (OUTPATIENT)
Dept: ENDOCRINOLOGY | Facility: MEDICAL CENTER | Age: 40
End: 2020-06-23
Payer: COMMERCIAL

## 2020-08-21 ENCOUNTER — APPOINTMENT (OUTPATIENT)
Dept: ENDOCRINOLOGY | Facility: MEDICAL CENTER | Age: 40
End: 2020-08-21
Attending: INTERNAL MEDICINE
Payer: COMMERCIAL

## 2020-08-25 DIAGNOSIS — E22.1 HYPERPROLACTINEMIA (HCC): ICD-10-CM

## 2020-08-25 RX ORDER — CABERGOLINE 0.5 MG/1
TABLET ORAL
Qty: 24 TAB | Refills: 3 | Status: SHIPPED | OUTPATIENT
Start: 2020-08-25 | End: 2020-09-23 | Stop reason: SDUPTHER

## 2020-08-25 NOTE — TELEPHONE ENCOUNTER
Received request via: Pharmacy    Was the patient seen in the last year in this department? Yes    Does the patient have an active prescription (recently filled or refills available) for medication(s) requested? No         cabergoline (DOSTINEX) 0.5 MG tablet    Si tab twice a week.

## 2020-09-21 PROBLEM — E55.9 VITAMIN D DEFICIENCY: Status: ACTIVE | Noted: 2020-09-21

## 2020-09-21 PROBLEM — E03.8 OTHER SPECIFIED HYPOTHYROIDISM: Status: ACTIVE | Noted: 2020-09-21

## 2020-09-21 NOTE — PROGRESS NOTES
"Endocrinology Clinic Progress Note  PCP: Michell Pires P.A.-C.    HPI:  Yun Clements is a 39 y.o. old patient who is seen today for review of her endocrine problems.      1. Hyperprolactinemia: currently on Cabergoline 0.5 mg twice a week.      Ref. Range 3/3/2020 06:26   Prolactin Latest Ref Range: 2.80 - 26.00 ng/mL 3.58     2. Hypothyroid: currently on Levothyroxine 75 mcg daily first thing in the morning on empty stomach.   Labs completed at [facility] on 8/15/2020 show TSH of 3.71, T4 of 1.1 and Free T3 of 2.8  3. Vitamin D deficiency: currently on Vitamin D 30286 iu per week.   Labs completed at [facility] on 8/15/20 show Vitamin D level of 71    Fatigue is significantly improved with the treatment of the above underlying conditions but continues to report hair loss.    ROS:  Constitutional: No weight loss  Skin and integumentary: hair loss  Cardiac: No palpitations or racing heart  Resp: No shortness of breath  Neuro: No numbness or tinging in feet  Endo: No heat or cold intolerance, no polyuria or polydipsia  All other systems were reviewed and were negative.      Labs: Reviewed    Physical Examination:  Vital signs: /68 (BP Location: Left arm, Patient Position: Sitting)   Pulse 75   Ht 1.575 m (5' 2\")   Wt 66.7 kg (147 lb)   SpO2 100%   BMI 26.89 kg/m²  Body mass index is 26.89 kg/m².  General: No apparent distress, cooperative  Eyes: No scleral icterus or discharge  ENMT: Normal on external inspection of nose, lips  Neck: No abnormal masses on inspection  Resp: Normal effort, clear to auscultation bilaterally   CVS: Regular rate and rhythm, S1 S2 normal, no murmur   Extremities: No edema  Abdomen: abdominal obesity present  Neuro: Alert and oriented  Skin: No rash  Psych: Normal mood and affect, intact memory and able to make informed decisions    Assessment and Plan:  1. Hyperprolactinemia (HCC)  Continue cabergoline therapy.  [facility] did not do the prolactin levels this time around.    2. " Other specified hypothyroidism  Increase Synthroid 200 mcg daily.  Clinically and biochemically she is still hypothyroid.    3. Vit d def:   Recommend to continue vit D 50,000 units once a week with food. Script sent to pharmacy.   Side effects and benefits discussed with patient in detail.   Discussed immune function of vit D and its role in some protection against COVID-19 as per recent early evidence.  Her vitamin D levels came up from 10 to 71 most recently.      Return in about 3 months (around 12/23/2020).    Thank you for allowing me to participate in the care of this patient.    Eduin Delgado M.D.  09/21/20    CC:   Michell Pires P.A.-C.    This note was created using voice recognition software (Dragon). The accuracy of the dictation is limited by the abilities of the software. I have reviewed the note prior to signing, however some errors in grammar and context are still possible. If you have any questions related to this note please do not hesitate to contact our office.

## 2020-09-23 ENCOUNTER — OFFICE VISIT (OUTPATIENT)
Dept: ENDOCRINOLOGY | Facility: MEDICAL CENTER | Age: 40
End: 2020-09-23
Attending: INTERNAL MEDICINE
Payer: COMMERCIAL

## 2020-09-23 VITALS
SYSTOLIC BLOOD PRESSURE: 102 MMHG | DIASTOLIC BLOOD PRESSURE: 68 MMHG | OXYGEN SATURATION: 100 % | HEIGHT: 62 IN | HEART RATE: 75 BPM | WEIGHT: 147 LBS | BODY MASS INDEX: 27.05 KG/M2

## 2020-09-23 DIAGNOSIS — E03.8 HYPOTHYROIDISM DUE TO HASHIMOTO'S THYROIDITIS: ICD-10-CM

## 2020-09-23 DIAGNOSIS — E03.8 OTHER SPECIFIED HYPOTHYROIDISM: ICD-10-CM

## 2020-09-23 DIAGNOSIS — E22.1 HYPERPROLACTINEMIA (HCC): ICD-10-CM

## 2020-09-23 DIAGNOSIS — E55.9 VITAMIN D DEFICIENCY: ICD-10-CM

## 2020-09-23 DIAGNOSIS — E03.9 HYPOTHYROIDISM, UNSPECIFIED TYPE: ICD-10-CM

## 2020-09-23 DIAGNOSIS — E06.3 HYPOTHYROIDISM DUE TO HASHIMOTO'S THYROIDITIS: ICD-10-CM

## 2020-09-23 PROCEDURE — 99214 OFFICE O/P EST MOD 30 MIN: CPT | Performed by: INTERNAL MEDICINE

## 2020-09-23 PROCEDURE — 99211 OFF/OP EST MAY X REQ PHY/QHP: CPT | Performed by: INTERNAL MEDICINE

## 2020-09-23 RX ORDER — CABERGOLINE 0.5 MG/1
TABLET ORAL
Qty: 24 TAB | Refills: 3 | Status: SHIPPED | OUTPATIENT
Start: 2020-09-23 | End: 2021-06-25 | Stop reason: SDUPTHER

## 2020-09-23 RX ORDER — LEVOTHYROXINE SODIUM 0.1 MG/1
100 TABLET ORAL
Qty: 90 TAB | Refills: 3 | Status: SHIPPED | OUTPATIENT
Start: 2020-09-23 | End: 2021-06-25

## 2020-09-23 RX ORDER — ERGOCALCIFEROL 1.25 MG/1
50000 CAPSULE ORAL
Qty: 12 CAP | Refills: 3 | Status: SHIPPED | OUTPATIENT
Start: 2020-09-23 | End: 2021-06-25 | Stop reason: SDUPTHER

## 2020-09-23 ASSESSMENT — FIBROSIS 4 INDEX: FIB4 SCORE: 0.48

## 2020-10-14 ENCOUNTER — OFFICE VISIT (OUTPATIENT)
Dept: MEDICAL GROUP | Facility: MEDICAL CENTER | Age: 40
End: 2020-10-14
Payer: COMMERCIAL

## 2020-10-14 VITALS
OXYGEN SATURATION: 96 % | HEIGHT: 62 IN | WEIGHT: 141 LBS | TEMPERATURE: 97.5 F | DIASTOLIC BLOOD PRESSURE: 76 MMHG | SYSTOLIC BLOOD PRESSURE: 104 MMHG | HEART RATE: 67 BPM | BODY MASS INDEX: 25.95 KG/M2

## 2020-10-14 DIAGNOSIS — Z00.00 WELLNESS EXAMINATION: ICD-10-CM

## 2020-10-14 DIAGNOSIS — Z13.6 SCREENING FOR CARDIOVASCULAR CONDITION: ICD-10-CM

## 2020-10-14 DIAGNOSIS — L65.9 HAIR LOSS: ICD-10-CM

## 2020-10-14 DIAGNOSIS — G44.229 CHRONIC TENSION-TYPE HEADACHE, NOT INTRACTABLE: ICD-10-CM

## 2020-10-14 DIAGNOSIS — N64.52 BREAST DISCHARGE: ICD-10-CM

## 2020-10-14 DIAGNOSIS — N94.6 PAINFUL MENSTRUAL PERIODS: ICD-10-CM

## 2020-10-14 DIAGNOSIS — E55.9 VITAMIN D DEFICIENCY: ICD-10-CM

## 2020-10-14 DIAGNOSIS — E03.8 OTHER SPECIFIED HYPOTHYROIDISM: ICD-10-CM

## 2020-10-14 DIAGNOSIS — E22.1 HYPERPROLACTINEMIA (HCC): ICD-10-CM

## 2020-10-14 PROBLEM — N92.6 ABNORMAL MENSTRUAL PERIODS: Status: RESOLVED | Noted: 2018-09-05 | Resolved: 2020-10-14

## 2020-10-14 PROCEDURE — 99395 PREV VISIT EST AGE 18-39: CPT | Performed by: PHYSICIAN ASSISTANT

## 2020-10-14 RX ORDER — LEVOTHYROXINE SODIUM 75 MCG
TABLET ORAL
COMMUNITY
Start: 2020-10-10 | End: 2020-10-14

## 2020-10-14 ASSESSMENT — FIBROSIS 4 INDEX: FIB4 SCORE: 0.48

## 2020-10-14 ASSESSMENT — PATIENT HEALTH QUESTIONNAIRE - PHQ9: CLINICAL INTERPRETATION OF PHQ2 SCORE: 0

## 2020-10-14 NOTE — ASSESSMENT & PLAN NOTE
Getting better since starting the cabergoline, does know that when she had her MRI the doctor told her there were changes in her brain caused by her migraines

## 2020-10-14 NOTE — ASSESSMENT & PLAN NOTE
Prior to treatment with cabergoline patient did now have periods at all. Now every month, about 3 days, painful but not too heavy. Not interested in birth control at this time.

## 2020-10-14 NOTE — ASSESSMENT & PLAN NOTE
Has noticed a lot of hair loss. Did have hair loss when she had her kids but her youngest is 6. Generally thinning. No patches. No other skin problem. No diet change. Trying to lose weight but not really losing easily. No big illness or big stress when it started.

## 2020-10-15 PROBLEM — M54.50 ACUTE BILATERAL LOW BACK PAIN WITHOUT SCIATICA: Status: RESOLVED | Noted: 2018-09-05 | Resolved: 2020-10-15

## 2020-10-15 NOTE — PROGRESS NOTES
Subjective:   Yun Clements is a 39 y.o. female here today for annual wellness exam. Up to date pap/pelvic. Non-smoker. 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 No recent injury or illness. 2 children.    Painful menstrual periods  Prior to treatment with cabergoline patient did now have periods at all. Now every month, about 3 days, painful but not too heavy. Not interested in birth control at this time.    Hair loss  Has noticed a lot of hair loss. Did have hair loss when she had her kids but her youngest is 6. Generally thinning. No patches. No other skin problem. No diet change. Trying to lose weight but not really losing easily. No big illness or big stress when it started.     Chronic tension-type headache, not intractable  Getting better since starting the cabergoline, does know that when she had her MRI the doctor told her there were changes in her brain caused by her migraines    Vitamin D deficiency  Taking vitamin d supplement    Breast discharge  Resolved now that pituitary abnormality is under treatment    Hyperprolactinemia (HCC)  Chronic, stable, managed with endocrionology    Other specified hypothyroidism  Chronic, stable, recently endocrinology increased dose of levothyroxine from 75mcg to 100mcg       Current medicines (including changes today)  Current Outpatient Medications   Medication Sig Dispense Refill   • levothyroxine (SYNTHROID) 100 MCG Tab Take 1 Tab by mouth Every morning on an empty stomach. Synthroid brand medically necessary. 90 Tab 3   • cabergoline (DOSTINEX) 0.5 MG tablet One tab two times a week. 24 Tab 3   • vitamin D, Ergocalciferol, (DRISDOL) 1.25 MG (39351 UT) Cap capsule Take 1 Cap by mouth every 7 days. With food. 12 Cap 3   • ibuprofen (MOTRIN) 800 MG Tab Take 1 Tab by mouth every 8 hours as needed. 20 Tab 3     No current facility-administered medications for this visit.      She  has no past medical history on file.    ROS   No  "fever/chills. No weight change. No headache/dizziness. No focal weakness. No sore throat, nasal congestion, ear pain. No chest pain, no shortness of breath, difficulty breathing. No n/v/d/c or abdominal pain. No urinary complaint. No rash or skin lesion. No joint pain or swelling.       Objective:     /76 (BP Location: Left arm, Patient Position: Sitting, BP Cuff Size: Adult long)   Pulse 67   Temp 36.4 °C (97.5 °F) (Temporal)   Ht 1.575 m (5' 2\")   Wt 64 kg (141 lb)   SpO2 96%  Body mass index is 25.79 kg/m².   Physical Exam:  Constitutional: WDWN, NAD  Skin: Warm, dry, good turgor, no rashes in visible areas.  Eye: Equal, round and reactive, conjunctiva clear, lids normal.  Neck: Trachea midline, no masses, no thyromegaly. No cervical or supraclavicular lymphadenopathy  Respiratory: Unlabored respiratory effort, lungs clear to auscultation, no wheezes, no ronchi.  Cardiovascular: Normal S1, S2, no murmur, no leg edema.  Psych: Alert and oriented x3, normal affect and mood.        Assessment and Plan:   The following treatment plan was discussed    1. Wellness examination    - CBC WITH DIFFERENTIAL; Future  - Comp Metabolic Panel; Future    2. Hair loss    - VITAMIN B12; Future    3. Painful menstrual periods      4. Chronic tension-type headache, not intractable      5. Vitamin D deficiency      6. Screening for cardiovascular condition    - Lipid Profile; Future    7. Breast discharge      8. Hyperprolactinemia (HCC)      9. Other specified hypothyroidism        Followup: after labs and as needed         "

## 2020-12-17 ENCOUNTER — HOSPITAL ENCOUNTER (OUTPATIENT)
Dept: LAB | Facility: MEDICAL CENTER | Age: 40
End: 2020-12-17
Attending: PHYSICIAN ASSISTANT
Payer: COMMERCIAL

## 2020-12-17 ENCOUNTER — HOSPITAL ENCOUNTER (OUTPATIENT)
Dept: LAB | Facility: MEDICAL CENTER | Age: 40
End: 2020-12-17
Attending: INTERNAL MEDICINE
Payer: COMMERCIAL

## 2020-12-17 DIAGNOSIS — Z13.6 SCREENING FOR CARDIOVASCULAR CONDITION: ICD-10-CM

## 2020-12-17 DIAGNOSIS — Z00.00 WELLNESS EXAMINATION: ICD-10-CM

## 2020-12-17 DIAGNOSIS — L65.9 HAIR LOSS: ICD-10-CM

## 2020-12-17 DIAGNOSIS — E55.9 VITAMIN D DEFICIENCY: ICD-10-CM

## 2020-12-17 DIAGNOSIS — E03.9 HYPOTHYROIDISM, UNSPECIFIED TYPE: ICD-10-CM

## 2020-12-17 DIAGNOSIS — E22.1 HYPERPROLACTINEMIA (HCC): ICD-10-CM

## 2020-12-17 LAB
25(OH)D3 SERPL-MCNC: 40 NG/ML (ref 30–100)
ALBUMIN SERPL BCP-MCNC: 4.3 G/DL (ref 3.2–4.9)
ALBUMIN/GLOB SERPL: 1.7 G/DL
ALP SERPL-CCNC: 80 U/L (ref 30–99)
ALT SERPL-CCNC: 16 U/L (ref 2–50)
ANION GAP SERPL CALC-SCNC: 4 MMOL/L (ref 7–16)
AST SERPL-CCNC: 18 U/L (ref 12–45)
BASOPHILS # BLD AUTO: 0.7 % (ref 0–1.8)
BASOPHILS # BLD: 0.06 K/UL (ref 0–0.12)
BILIRUB SERPL-MCNC: 0.5 MG/DL (ref 0.1–1.5)
BUN SERPL-MCNC: 11 MG/DL (ref 8–22)
CALCIUM SERPL-MCNC: 9.3 MG/DL (ref 8.5–10.5)
CHLORIDE SERPL-SCNC: 103 MMOL/L (ref 96–112)
CHOLEST SERPL-MCNC: 139 MG/DL (ref 100–199)
CO2 SERPL-SCNC: 27 MMOL/L (ref 20–33)
CREAT SERPL-MCNC: 0.67 MG/DL (ref 0.5–1.4)
EOSINOPHIL # BLD AUTO: 0.12 K/UL (ref 0–0.51)
EOSINOPHIL NFR BLD: 1.4 % (ref 0–6.9)
ERYTHROCYTE [DISTWIDTH] IN BLOOD BY AUTOMATED COUNT: 41.3 FL (ref 35.9–50)
FASTING STATUS PATIENT QL REPORTED: NORMAL
GLOBULIN SER CALC-MCNC: 2.6 G/DL (ref 1.9–3.5)
GLUCOSE SERPL-MCNC: 101 MG/DL (ref 65–99)
HCT VFR BLD AUTO: 41.7 % (ref 37–47)
HDLC SERPL-MCNC: 54 MG/DL
HGB BLD-MCNC: 13.6 G/DL (ref 12–16)
IMM GRANULOCYTES # BLD AUTO: 0.02 K/UL (ref 0–0.11)
IMM GRANULOCYTES NFR BLD AUTO: 0.2 % (ref 0–0.9)
LDLC SERPL CALC-MCNC: 68 MG/DL
LYMPHOCYTES # BLD AUTO: 2.56 K/UL (ref 1–4.8)
LYMPHOCYTES NFR BLD: 29.3 % (ref 22–41)
MCH RBC QN AUTO: 28.8 PG (ref 27–33)
MCHC RBC AUTO-ENTMCNC: 32.6 G/DL (ref 33.6–35)
MCV RBC AUTO: 88.3 FL (ref 81.4–97.8)
MONOCYTES # BLD AUTO: 0.5 K/UL (ref 0–0.85)
MONOCYTES NFR BLD AUTO: 5.7 % (ref 0–13.4)
NEUTROPHILS # BLD AUTO: 5.47 K/UL (ref 2–7.15)
NEUTROPHILS NFR BLD: 62.7 % (ref 44–72)
NRBC # BLD AUTO: 0 K/UL
NRBC BLD-RTO: 0 /100 WBC
PLATELET # BLD AUTO: 379 K/UL (ref 164–446)
PMV BLD AUTO: 9 FL (ref 9–12.9)
POTASSIUM SERPL-SCNC: 4.2 MMOL/L (ref 3.6–5.5)
PROLACTIN SERPL-MCNC: 7.01 NG/ML (ref 2.8–26)
PROT SERPL-MCNC: 6.9 G/DL (ref 6–8.2)
RBC # BLD AUTO: 4.72 M/UL (ref 4.2–5.4)
SODIUM SERPL-SCNC: 134 MMOL/L (ref 135–145)
T3 SERPL-MCNC: 104 NG/DL (ref 60–181)
T3FREE SERPL-MCNC: 2.95 PG/ML (ref 2–4.4)
T4 FREE SERPL-MCNC: 1.4 NG/DL (ref 0.93–1.7)
TRIGL SERPL-MCNC: 84 MG/DL (ref 0–149)
TSH SERPL DL<=0.005 MIU/L-ACNC: 1.16 UIU/ML (ref 0.38–5.33)
VIT B12 SERPL-MCNC: 1185 PG/ML (ref 211–911)
WBC # BLD AUTO: 8.7 K/UL (ref 4.8–10.8)

## 2020-12-17 PROCEDURE — 84439 ASSAY OF FREE THYROXINE: CPT

## 2020-12-17 PROCEDURE — 85025 COMPLETE CBC W/AUTO DIFF WBC: CPT

## 2020-12-17 PROCEDURE — 82306 VITAMIN D 25 HYDROXY: CPT

## 2020-12-17 PROCEDURE — 84481 FREE ASSAY (FT-3): CPT

## 2020-12-17 PROCEDURE — 82607 VITAMIN B-12: CPT

## 2020-12-17 PROCEDURE — 36415 COLL VENOUS BLD VENIPUNCTURE: CPT

## 2020-12-17 PROCEDURE — 84480 ASSAY TRIIODOTHYRONINE (T3): CPT

## 2020-12-17 PROCEDURE — 80061 LIPID PANEL: CPT

## 2020-12-17 PROCEDURE — 84443 ASSAY THYROID STIM HORMONE: CPT

## 2020-12-17 PROCEDURE — 84146 ASSAY OF PROLACTIN: CPT

## 2020-12-17 PROCEDURE — 80053 COMPREHEN METABOLIC PANEL: CPT

## 2020-12-23 ENCOUNTER — TELEMEDICINE (OUTPATIENT)
Dept: ENDOCRINOLOGY | Facility: MEDICAL CENTER | Age: 40
End: 2020-12-23
Attending: INTERNAL MEDICINE
Payer: COMMERCIAL

## 2020-12-23 DIAGNOSIS — E22.1 HYPERPROLACTINEMIA (HCC): ICD-10-CM

## 2020-12-23 DIAGNOSIS — E03.8 OTHER SPECIFIED HYPOTHYROIDISM: ICD-10-CM

## 2020-12-23 DIAGNOSIS — E55.9 VITAMIN D DEFICIENCY: ICD-10-CM

## 2020-12-23 PROCEDURE — 99214 OFFICE O/P EST MOD 30 MIN: CPT | Mod: 95,CR | Performed by: INTERNAL MEDICINE

## 2020-12-23 NOTE — PROGRESS NOTES
Chief Complaint: Follow up for hyperprolactinemia, hypothyroidism, vitamin D deficiency    Patient was presented for a telehealth consultation via secure and encrypted videoconferencing technology. This encounter was conducted via Zoom . Verbal consent was obtained. Patient's identity was verified.    HPI:     Yun Clements is a 40 y.o. female here for follow up of hyperprolactinemia, hypothyroidism, vitamin D deficiency    Hyperprolactinemia- On cabergoline 0.5 mg twice weekly, MRI from August 2019 did not demonstrate any tumor.  Most recent prolactin level 7.0 from 12/17/2020. States she was off medication for 1 month when she was in [place], but is back on it now.    Hypothyroidism-was instructed to increase her levothyroxine from 75 to 100 MCG daily at last office visit.  Most recent TSH from 12/17/2020 is 1.16.    Since last visit patient reports feeling good. She reports excellent compliance and denies missing any daily doses.   She takes thyroid hormone prior to breakfast.   She  denies taking any iron, calcium supplements or antacids.  Weight has been stable. She currently reports hair loss for the past several months.       Vitamin D deficiency-on 50,000 units q. weekly, most recent vitamin D level 40 from 12/17/2020.    Patient's medications, allergies, and social histories were reviewed and updated as appropriate.      ROS:     CONS:     No fever, no chills   EYES:     No diplopia, no blurry vision   CV:           No chest pain, no palpitations   PULM:     No SOB, no cough, no hemoptysis.   GI:            No nausea, no vomiting, no diarrhea, no constipation   ENDO:     No polyuria, no polydipsia, no heat intolerance, no cold intolerance       Past Medical History:  Problem List:  2020-10: Hair loss  2020-10: Painful menstrual periods  2020-09: Other specified hypothyroidism  2020-09: Vitamin D deficiency  2018-11: Hyperprolactinemia (HCC)  2018-09: Abnormal menstrual periods  2018-09: Acute  bilateral low back pain without sciatica  2018-09: Breast discharge  2018-09: Chronic tension-type headache, not intractable      Past Surgical History:  No past surgical history on file.     Allergies:  Patient has no known allergies.     Social History:  Social History     Tobacco Use   • Smoking status: Never Smoker   • Smokeless tobacco: Never Used   Substance Use Topics   • Alcohol use: Yes     Alcohol/week: 0.6 oz     Types: 1 Glasses of wine per week     Comment: rarely, once a month   • Drug use: No        Family History:   family history includes Diabetes in her maternal grandfather and mother.      PHYSICAL EXAM:     PHYSICAL EXAM:   Vital signs: There were no vitals taken for this visit.  GENERAL: Well-developed, well-nourished in no apparent distress.   EYE:  No ocular asymmetry, conjunctiva appear normal  HENT: Atraumatic, normocephalic, normal facies  NECK:  No neck masses or thyromegaly visualized  LUNGS:  Appears to be breathing normally, no signs of respiratory distress  EXTREMITIES:  Normal range of motion visualized  NEUROLOGICAL: No gross focal motor abnormalities visualized  LYMPH: No cervical adenopathy visualized  SKIN: No rashes, lesions visualized within limits of telemedicine visit      Labs:  Lab Results   Component Value Date/Time    SODIUM 134 (L) 12/17/2020 07:05 AM    POTASSIUM 4.2 12/17/2020 07:05 AM    CHLORIDE 103 12/17/2020 07:05 AM    CO2 27 12/17/2020 07:05 AM    ANION 4.0 (L) 12/17/2020 07:05 AM    GLUCOSE 101 (H) 12/17/2020 07:05 AM    BUN 11 12/17/2020 07:05 AM    CREATININE 0.67 12/17/2020 07:05 AM    CALCIUM 9.3 12/17/2020 07:05 AM    ASTSGOT 18 12/17/2020 07:05 AM    ALTSGPT 16 12/17/2020 07:05 AM    TBILIRUBIN 0.5 12/17/2020 07:05 AM    ALBUMIN 4.3 12/17/2020 07:05 AM    TOTPROTEIN 6.9 12/17/2020 07:05 AM    GLOBULIN 2.6 12/17/2020 07:05 AM    AGRATIO 1.7 12/17/2020 07:05 AM       Lab Results   Component Value Date/Time    SODIUM 134 (L) 12/17/2020 0705    POTASSIUM 4.2  12/17/2020 0705    CHLORIDE 103 12/17/2020 0705    CO2 27 12/17/2020 0705    GLUCOSE 101 (H) 12/17/2020 0705    BUN 11 12/17/2020 0705    CREATININE 0.67 12/17/2020 0705    CALCIUM 9.3 12/17/2020 0705    ANION 4.0 (L) 12/17/2020 0705       Lab Results   Component Value Date/Time    CHOLSTRLTOT 139 12/17/2020 0705    TRIGLYCERIDE 84 12/17/2020 0705    HDL 54 12/17/2020 0705    LDL 68 12/17/2020 0705       Lab Results   Component Value Date/Time    TSHULTRASEN 1.160 12/17/2020 0706     Lab Results   Component Value Date/Time    FREET4 1.40 12/17/2020 0706     Lab Results   Component Value Date/Time    FREET3 2.95 12/17/2020 0706     No results found for: THYSTIMIG    Lab Results   Component Value Date/Time    MICROSOMALA 54.7 (H) 03/03/2020 0626         Imaging:      ASSESSMENT/PLAN:     1. Hyperprolactinemia (HCC)  On cabergoline 0.5 mg twice weekly, MRI from August 2019 did not demonstrate any tumor.  Most recent prolactin level 7.0 from 12/17/2020. States she was off medication for 1 month when she was in [place], but is back on it now.  -Continue cabergoline 0.5 mg twice weekly as prescribed  - Prolactin level prior to 6-month visit  - MRI brain pituitary imaging prior to follow-up next year  - Follow-up with Ruba or Zaynab in 6 months    2. Other specified hypothyroidism  Was instructed to increase her levothyroxine from 75 to 100 MCG daily at last office visit.  Most recent TSH from 12/17/2020 is 1.16. Since last visit patient reports feeling good. She reports excellent compliance and denies missing any daily doses.   She takes thyroid hormone prior to breakfast.   She  denies taking any iron, calcium supplements or antacids.  Weight has been stable. She currently reports hair loss for the past several months.  - Continue levothyroxine 100 MCG at this time  - Thyroid lab work prior to 6-month follow-up visit    3. Vitamin D deficiency  On 50,000 units q. weekly, most recent vitamin D level 40 from 12/17/2020.  -  Continue 50,000 units q. weekly vitamin D supplementation  - Vitamin D level prior to 6-month follow-up    - PROLACTIN; Future  - TSH; Future  - FREE THYROXINE; Future  - Comp Metabolic Panel; Future  - T3 FREE; Future  - TRIIDOTHYRONINE; Future  - VITAMIN D,25 HYDROXY; Future  - MR-BRAIN PITUITARY W/WO; Future    Return in about 6 months (around 6/23/2021).    This patient during there office visit today was started on a new medication.  Side effects of the new medication were discussed with the patient today in the office.     Thank you kindly for allowing me to participate in the thyroid care plan for this patient.    Cresencio Horan MD, [facility], [facility]  12/23/20    CC:   Michell Pires P.A.-C.

## 2021-01-11 ENCOUNTER — HOSPITAL ENCOUNTER (OUTPATIENT)
Facility: MEDICAL CENTER | Age: 41
End: 2021-01-11
Attending: PREVENTIVE MEDICINE
Payer: COMMERCIAL

## 2021-01-11 ENCOUNTER — NON-PROVIDER VISIT (OUTPATIENT)
Dept: OCCUPATIONAL MEDICINE | Facility: CLINIC | Age: 41
End: 2021-01-11
Payer: COMMERCIAL

## 2021-01-11 DIAGNOSIS — Z02.1 PRE-EMPLOYMENT HEALTH SCREENING EXAMINATION: ICD-10-CM

## 2021-01-11 DIAGNOSIS — Z02.1 PRE-EMPLOYMENT HEALTH SCREENING EXAMINATION: Primary | ICD-10-CM

## 2021-01-11 PROCEDURE — 86480 TB TEST CELL IMMUN MEASURE: CPT | Performed by: PREVENTIVE MEDICINE

## 2021-01-13 LAB
GAMMA INTERFERON BACKGROUND BLD IA-ACNC: 0.04 IU/ML
M TB IFN-G BLD-IMP: NEGATIVE
M TB IFN-G CD4+ BCKGRND COR BLD-ACNC: -0.01 IU/ML
MITOGEN IGNF BCKGRD COR BLD-ACNC: >10 IU/ML
QFT TB2 - NIL TBQ2: -0.01 IU/ML

## 2021-05-04 ENCOUNTER — OFFICE VISIT (OUTPATIENT)
Dept: MEDICAL GROUP | Facility: MEDICAL CENTER | Age: 41
End: 2021-05-04
Payer: COMMERCIAL

## 2021-05-04 VITALS
DIASTOLIC BLOOD PRESSURE: 60 MMHG | WEIGHT: 145.8 LBS | HEIGHT: 62 IN | SYSTOLIC BLOOD PRESSURE: 110 MMHG | OXYGEN SATURATION: 96 % | BODY MASS INDEX: 26.83 KG/M2 | TEMPERATURE: 98.1 F | HEART RATE: 80 BPM

## 2021-05-04 DIAGNOSIS — R10.2 PELVIC PAIN: ICD-10-CM

## 2021-05-04 DIAGNOSIS — E22.1 HYPERPROLACTINEMIA (HCC): ICD-10-CM

## 2021-05-04 DIAGNOSIS — E03.8 OTHER SPECIFIED HYPOTHYROIDISM: ICD-10-CM

## 2021-05-04 DIAGNOSIS — L65.9 HAIR LOSS: ICD-10-CM

## 2021-05-04 DIAGNOSIS — N94.6 PAINFUL MENSTRUAL PERIODS: ICD-10-CM

## 2021-05-04 PROCEDURE — 99213 OFFICE O/P EST LOW 20 MIN: CPT | Performed by: PHYSICIAN ASSISTANT

## 2021-05-04 ASSESSMENT — PATIENT HEALTH QUESTIONNAIRE - PHQ9: CLINICAL INTERPRETATION OF PHQ2 SCORE: 0

## 2021-05-04 ASSESSMENT — FIBROSIS 4 INDEX: FIB4 SCORE: 0.46

## 2021-05-04 NOTE — ASSESSMENT & PLAN NOTE
Still having right lower quadrant pain during her period - really bad and radiates to the right leg, not really heavy periods but gets big clots. Doesn't want to be on BCPs. Tried IUD but couldn't tolerate. Will check ultrasound to look for structural abnormality such as fibroids or cysts. Plan to refer to GYN for follow up

## 2021-05-04 NOTE — PROGRESS NOTES
"Subjective:   Yun Clements is a 40 y.o. female here today for follow up on chronic conditions    Painful menstrual periods  Still having right lower quadrant pain during her period - really bad and radiates to the right leg, not really heavy periods but gets big clots. Doesn't want to be on BCPs. Tried IUD but couldn't tolerate. Will check ultrasound to look for structural abnormality such as fibroids or cysts. Plan to refer to GYN for follow up    Hair loss  Getting worse, now has bald spot, has had some family stress, not sure if that is related - endocrinology rec seeing derm, will place referral today    Hyperprolactinemia (HCC)  Chronic, stable on current, will f/u with endo in June    Other specified hypothyroidism  Chronic, stable on current, has f/u with endo in June       Current medicines (including changes today)  Current Outpatient Medications   Medication Sig Dispense Refill   • levothyroxine (SYNTHROID) 100 MCG Tab Take 1 Tab by mouth Every morning on an empty stomach. Synthroid brand medically necessary. 90 Tab 3   • cabergoline (DOSTINEX) 0.5 MG tablet One tab two times a week. 24 Tab 3   • vitamin D, Ergocalciferol, (DRISDOL) 1.25 MG (22407 UT) Cap capsule Take 1 Cap by mouth every 7 days. With food. 12 Cap 3   • ibuprofen (MOTRIN) 800 MG Tab Take 1 Tab by mouth every 8 hours as needed. 20 Tab 3     No current facility-administered medications for this visit.     She  has no past medical history on file.    ROS   No fever/chills. No weight change. No headache/dizziness. No focal weakness. No sore throat, nasal congestion, ear pain. No chest pain, no shortness of breath, difficulty breathing. No n/v/d/c or abdominal pain. No urinary complaint. No rash or skin lesion. No joint pain or swelling.       Objective:     /60 (BP Location: Left arm, Patient Position: Sitting, BP Cuff Size: Adult long)   Pulse 80   Temp 36.7 °C (98.1 °F) (Temporal)   Ht 1.575 m (5' 2\")   Wt 66.1 kg (145 lb " 12.8 oz)   SpO2 96%  Body mass index is 26.67 kg/m².   Physical Exam:  Constitutional: WDWN, NAD  Skin: Warm, dry, good turgor, no rashes in visible areas.  Psych: Alert and oriented x3, normal affect and mood.    Assessment and Plan:   The following treatment plan was discussed    1. Painful menstrual periods    - US-PELVIC COMPLETE (TRANSABDOMINAL/TRANSVAGINAL) (COMBO); Future    2. Pelvic pain    - US-PELVIC COMPLETE (TRANSABDOMINAL/TRANSVAGINAL) (COMBO); Future    3. Hair loss    - REFERRAL TO DERMATOLOGY    4. Hyperprolactinemia (HCC)      5. Other specified hypothyroidism        Followup: after ultrasound

## 2021-05-04 NOTE — ASSESSMENT & PLAN NOTE
Getting worse, now has bald spot, has had some family stress, not sure if that is related - endocrinology rec seeing derm, will place referral today

## 2021-06-08 ENCOUNTER — HOSPITAL ENCOUNTER (OUTPATIENT)
Dept: RADIOLOGY | Facility: MEDICAL CENTER | Age: 41
End: 2021-06-08
Attending: PHYSICIAN ASSISTANT
Payer: COMMERCIAL

## 2021-06-08 DIAGNOSIS — R10.2 PELVIC PAIN: ICD-10-CM

## 2021-06-08 DIAGNOSIS — N94.6 PAINFUL MENSTRUAL PERIODS: ICD-10-CM

## 2021-06-08 PROCEDURE — 76830 TRANSVAGINAL US NON-OB: CPT

## 2021-06-15 ENCOUNTER — APPOINTMENT (OUTPATIENT)
Dept: LAB | Facility: MEDICAL CENTER | Age: 41
End: 2021-06-15
Payer: COMMERCIAL

## 2021-06-19 ENCOUNTER — HOSPITAL ENCOUNTER (OUTPATIENT)
Dept: LAB | Facility: MEDICAL CENTER | Age: 41
End: 2021-06-19
Attending: STUDENT IN AN ORGANIZED HEALTH CARE EDUCATION/TRAINING PROGRAM
Payer: COMMERCIAL

## 2021-06-19 DIAGNOSIS — E03.8 OTHER SPECIFIED HYPOTHYROIDISM: ICD-10-CM

## 2021-06-19 DIAGNOSIS — E22.1 HYPERPROLACTINEMIA (HCC): ICD-10-CM

## 2021-06-19 DIAGNOSIS — E55.9 VITAMIN D DEFICIENCY: ICD-10-CM

## 2021-06-19 LAB
25(OH)D3 SERPL-MCNC: 44 NG/ML (ref 30–100)
ALBUMIN SERPL BCP-MCNC: 3.9 G/DL (ref 3.2–4.9)
ALBUMIN/GLOB SERPL: 1.3 G/DL
ALP SERPL-CCNC: 74 U/L (ref 30–99)
ALT SERPL-CCNC: 21 U/L (ref 2–50)
ANION GAP SERPL CALC-SCNC: 11 MMOL/L (ref 7–16)
AST SERPL-CCNC: 20 U/L (ref 12–45)
BILIRUB SERPL-MCNC: 0.4 MG/DL (ref 0.1–1.5)
BUN SERPL-MCNC: 11 MG/DL (ref 8–22)
CALCIUM SERPL-MCNC: 9 MG/DL (ref 8.5–10.5)
CHLORIDE SERPL-SCNC: 105 MMOL/L (ref 96–112)
CO2 SERPL-SCNC: 23 MMOL/L (ref 20–33)
CREAT SERPL-MCNC: 0.81 MG/DL (ref 0.5–1.4)
GLOBULIN SER CALC-MCNC: 2.9 G/DL (ref 1.9–3.5)
GLUCOSE SERPL-MCNC: 95 MG/DL (ref 65–99)
POTASSIUM SERPL-SCNC: 4.2 MMOL/L (ref 3.6–5.5)
PROLACTIN SERPL-MCNC: 5.5 NG/ML (ref 2.8–26)
PROT SERPL-MCNC: 6.8 G/DL (ref 6–8.2)
SODIUM SERPL-SCNC: 139 MMOL/L (ref 135–145)
T3 SERPL-MCNC: 109 NG/DL (ref 60–181)
T3FREE SERPL-MCNC: 2.65 PG/ML (ref 2–4.4)
T4 FREE SERPL-MCNC: 1.51 NG/DL (ref 0.93–1.7)
TSH SERPL DL<=0.005 MIU/L-ACNC: 0.13 UIU/ML (ref 0.38–5.33)

## 2021-06-19 PROCEDURE — 36415 COLL VENOUS BLD VENIPUNCTURE: CPT

## 2021-06-19 PROCEDURE — 82306 VITAMIN D 25 HYDROXY: CPT

## 2021-06-19 PROCEDURE — 80053 COMPREHEN METABOLIC PANEL: CPT

## 2021-06-19 PROCEDURE — 84439 ASSAY OF FREE THYROXINE: CPT

## 2021-06-19 PROCEDURE — 84480 ASSAY TRIIODOTHYRONINE (T3): CPT

## 2021-06-19 PROCEDURE — 84146 ASSAY OF PROLACTIN: CPT

## 2021-06-19 PROCEDURE — 84443 ASSAY THYROID STIM HORMONE: CPT

## 2021-06-19 PROCEDURE — 84481 FREE ASSAY (FT-3): CPT

## 2021-06-25 ENCOUNTER — OFFICE VISIT (OUTPATIENT)
Dept: ENDOCRINOLOGY | Facility: MEDICAL CENTER | Age: 41
End: 2021-06-25
Attending: INTERNAL MEDICINE
Payer: COMMERCIAL

## 2021-06-25 VITALS
DIASTOLIC BLOOD PRESSURE: 70 MMHG | HEART RATE: 76 BPM | BODY MASS INDEX: 27.23 KG/M2 | SYSTOLIC BLOOD PRESSURE: 110 MMHG | OXYGEN SATURATION: 97 % | HEIGHT: 62 IN | WEIGHT: 148 LBS

## 2021-06-25 DIAGNOSIS — E22.1 HYPERPROLACTINEMIA (HCC): ICD-10-CM

## 2021-06-25 DIAGNOSIS — L63.9 ALOPECIA AREATA: ICD-10-CM

## 2021-06-25 DIAGNOSIS — E06.3 HASHIMOTO'S THYROIDITIS: ICD-10-CM

## 2021-06-25 DIAGNOSIS — E55.9 VITAMIN D DEFICIENCY: ICD-10-CM

## 2021-06-25 DIAGNOSIS — L65.9 HAIR LOSS: ICD-10-CM

## 2021-06-25 DIAGNOSIS — E03.9 ACQUIRED HYPOTHYROIDISM: ICD-10-CM

## 2021-06-25 PROCEDURE — 99214 OFFICE O/P EST MOD 30 MIN: CPT | Performed by: INTERNAL MEDICINE

## 2021-06-25 PROCEDURE — 99212 OFFICE O/P EST SF 10 MIN: CPT | Performed by: INTERNAL MEDICINE

## 2021-06-25 RX ORDER — ERGOCALCIFEROL 1.25 MG/1
50000 CAPSULE ORAL
Qty: 12 CAPSULE | Refills: 3 | Status: SHIPPED | OUTPATIENT
Start: 2021-06-25 | End: 2022-04-22 | Stop reason: SDUPTHER

## 2021-06-25 RX ORDER — CABERGOLINE 0.5 MG/1
TABLET ORAL
Qty: 24 TABLET | Refills: 3 | Status: SHIPPED | OUTPATIENT
Start: 2021-06-25 | End: 2022-04-22 | Stop reason: SDUPTHER

## 2021-06-25 RX ORDER — LEVOTHYROXINE SODIUM 88 MCG
88 TABLET ORAL
Qty: 90 TABLET | Refills: 3 | Status: SHIPPED | OUTPATIENT
Start: 2021-06-25 | End: 2022-04-22 | Stop reason: SDUPTHER

## 2021-06-25 ASSESSMENT — FIBROSIS 4 INDEX: FIB4 SCORE: 0.45

## 2021-06-25 NOTE — PROGRESS NOTES
Chief Complaint: Follow up for Primary Hypothyroidism secondary to Hashimoto's thyroiditis, idiopathic hyperprolactinemia,    HPI:     Yun Clements is a 40 y.o. female here for follow up of the above medical issue    She has a history of hyperprolactinemia with baseline prolactin levels of greater than 70 diagnosed in [id number] with baseline MRI in 2019 showing no pituitary growth or abnormality    She is currently on cabergoline 0.5 mg 1 tablet twice a week which has been her medication for over 6 months.  Her last prolactin was well controlled at 5.5 on June 19, 2021.  She reports regular menstrual cycles and denies breast swelling and breast tenderness or nipple discharge.  She denies headaches and blurring of vision.    Currently she wants to defer getting an MRI this year because of personal reasons    With regards to her primary hypothyroidism she has Hashimoto's thyroiditis with baseline elevated TPO antibodies of 400 back in 2018 or 2019.  TSH levels have been elevated in the past greater than 5    She is currently on Synthroid 100 mcg daily.  She prefers brand-name medication her TSH is low at 0.13 with a free T4 of 1.5 on June 19, 2021  She reports fatigue and easy fatigability  She denies palpitations and tremors      Incidentally she reports hair loss.  She showed me in nonscarring, oval patch on her scalp located behind her right ear and this is compatible with alopecia areata    I explained to her that thyroid disorders are associated with this and other autoimmune conditions but they are not necessarily the etiologic factor      She informed me that she has an appointment to see a dermatologist        Patient's medications, allergies, and social histories were reviewed and updated as appropriate.      ROS:     CONS:     No fever, no chills   EYES:     No diplopia, no blurry vision   CV:           No chest pain, no palpitations   PULM:     No SOB, no cough, no hemoptysis.   GI:            No  "nausea, no vomiting, no diarrhea, no constipation   ENDO:     No polyuria, no polydipsia, no heat intolerance, no cold intolerance       Past Medical History:  Problem List:  2021-06: Hashimoto's thyroiditis  2021-06: Alopecia areata  2020-10: Hair loss  2020-10: Painful menstrual periods  2020-09: Acquired hypothyroidism  2020-09: Vitamin D deficiency  2018-11: Hyperprolactinemia (HCC)  2018-09: Abnormal menstrual periods  2018-09: Acute bilateral low back pain without sciatica  2018-09: Breast discharge  2018-09: Chronic tension-type headache, not intractable      Past Surgical History:  No past surgical history on file.     Allergies:  Patient has no known allergies.     Social History:  Social History     Tobacco Use   • Smoking status: Never Smoker   • Smokeless tobacco: Never Used   Vaping Use   • Vaping Use: Never used   Substance Use Topics   • Alcohol use: Yes     Alcohol/week: 0.6 oz     Types: 1 Glasses of wine per week     Comment: rarely, once a month   • Drug use: No        Family History:   family history includes Diabetes in her maternal grandfather and mother.      PHYSICAL EXAM:   Vital signs: /70   Pulse 76   Ht 1.575 m (5' 2\")   Wt 67.1 kg (148 lb)   SpO2 97%   BMI 27.07 kg/m²   GENERAL: Well-developed, well-nourished in no apparent distress.   EYE:  No ocular asymmetry, PERRLA  HENT: Pink, moist mucous membranes.    NECK: No thyromegaly.   CARDIOVASCULAR:  No murmurs  LUNGS: Clear breath sounds  ABDOMEN: Soft, nontender   EXTREMITIES: No clubbing, cyanosis, or edema.   NEUROLOGICAL: No gross focal motor abnormalities   LYMPH: No cervical adenopathy palpated.   SKIN: oval-shaped circumscribed nonscarred area of scalp behind right ear with no visible hair growth      Labs:  Lab Results   Component Value Date/Time    SODIUM 139 06/19/2021 07:27 AM    POTASSIUM 4.2 06/19/2021 07:27 AM    CHLORIDE 105 06/19/2021 07:27 AM    CO2 23 06/19/2021 07:27 AM    ANION 11.0 06/19/2021 07:27 AM    " GLUCOSE 95 06/19/2021 07:27 AM    BUN 11 06/19/2021 07:27 AM    CREATININE 0.81 06/19/2021 07:27 AM    CALCIUM 9.0 06/19/2021 07:27 AM    ASTSGOT 20 06/19/2021 07:27 AM    ALTSGPT 21 06/19/2021 07:27 AM    TBILIRUBIN 0.4 06/19/2021 07:27 AM    ALBUMIN 3.9 06/19/2021 07:27 AM    TOTPROTEIN 6.8 06/19/2021 07:27 AM    GLOBULIN 2.9 06/19/2021 07:27 AM    AGRATIO 1.3 06/19/2021 07:27 AM       Lab Results   Component Value Date/Time    SODIUM 139 06/19/2021 0727    POTASSIUM 4.2 06/19/2021 0727    CHLORIDE 105 06/19/2021 0727    CO2 23 06/19/2021 0727    GLUCOSE 95 06/19/2021 0727    BUN 11 06/19/2021 0727    CREATININE 0.81 06/19/2021 0727    CALCIUM 9.0 06/19/2021 0727    ANION 11.0 06/19/2021 0727       Lab Results   Component Value Date/Time    CHOLSTRLTOT 139 12/17/2020 0705    TRIGLYCERIDE 84 12/17/2020 0705    HDL 54 12/17/2020 0705    LDL 68 12/17/2020 0705       Lab Results   Component Value Date/Time    TSHULTRASEN 0.130 (L) 06/19/2021 0727     Lab Results   Component Value Date/Time    FREET4 1.51 06/19/2021 0727     Lab Results   Component Value Date/Time    FREET3 2.65 06/19/2021 0727     No results found for: THYSTIMIG    Lab Results   Component Value Date/Time    MICROSOMALA 54.7 (H) 03/03/2020 0626         Imaging:      ASSESSMENT/PLAN:     1. Acquired hypothyroidism  Unstable  TSH is slightly low  She reports fatigue  We will adjust Synthroid to 88 mcg daily  We will plan for follow-up in 3 to 6 months with repeat of her TSH and free T4 levels    2. Hashimoto's thyroiditis  This is the etiology of her hypothyroidism    3. Hyperprolactinemia (HCC)  Controlled  She has idiopathic hyperprolactinemia with negative MRI  Continue cabergoline 0.5 mg twice a week  Repeat prolactin in 3 to 6 months    4. Vitamin D deficiency  Controlled  Vitamin D is fair at 44  Continue ergocalciferol  Repeat calcium and vitamin D levels in 3 to 6 months    5. Hair loss  Patient has nonscarring patchy hair loss consistent with  alopecia areata  This is not explained by her thyroid  Although alopecia areata is associated with autoimmune conditions  I do recommend that she see dermatology    6. Alopecia areata  This is the etiology of her hair loss  I explained to her that other autoimmune conditions are associated with this but they are not necessarily the etiologic factors  I recommend that she keep her follow-up with dermatology      Return in about 4 months (around 10/25/2021).      Thank you kindly for allowing me to participate in the thyroid care plan for this patient.    Cresencio Horan MD, FACE, [facility]  06/25/21    CC:   Michell Pires P.A.-C.

## 2021-06-28 ENCOUNTER — APPOINTMENT (OUTPATIENT)
Dept: ENDOCRINOLOGY | Facility: MEDICAL CENTER | Age: 41
End: 2021-06-28
Attending: INTERNAL MEDICINE
Payer: COMMERCIAL

## 2021-08-12 ENCOUNTER — OFFICE VISIT (OUTPATIENT)
Dept: DERMATOLOGY | Facility: IMAGING CENTER | Age: 41
End: 2021-08-12
Payer: COMMERCIAL

## 2021-08-12 VITALS — TEMPERATURE: 97.5 F | BODY MASS INDEX: 27.23 KG/M2 | WEIGHT: 148 LBS | HEIGHT: 62 IN

## 2021-08-12 DIAGNOSIS — L64.9 ANDROGENETIC ALOPECIA: ICD-10-CM

## 2021-08-12 DIAGNOSIS — L65.9 ALOPECIA: ICD-10-CM

## 2021-08-12 PROCEDURE — 99203 OFFICE O/P NEW LOW 30 MIN: CPT | Performed by: DERMATOLOGY

## 2021-08-12 ASSESSMENT — FIBROSIS 4 INDEX: FIB4 SCORE: 0.45

## 2021-08-12 NOTE — PROGRESS NOTES
CC: Hair loss      Subjective: new patient here for alopecia, scalp.     Hairloss location: scalp or other locations too? Just scalp  Any other symptoms (itching, scaling, redness, other)?  No  Any dietary restrictions? (vegan, vegetarian/other) No  Any family history of hair thinning/balding in women or men? Dad with balding  Any illnesses, infections, pregnancy, or high stress 3-9 months preceding loss?  Two deaths in family early 2020.  Started noticing thinning, has a few spots of no hair at all.  Any labs done?  Yes, in chart    Treatments tried: None    ROS: no fevers/chills. No itch.  No cough  DermPMH: no skin cancer/melanoma  No problem-specific Assessment & Plan notes found for this encounter.    Relevant PMH: thyroid dz - on trx.  Social: NS    PE: Gen:WDWN female in NAD. Skin: scalp without scalp scaling. Hair pull test negative. No erythema.  Mid-part widening and miniaturized hairs on part. Patch of alopecia with fine regrwoth noted, right occiput0     A/P: alopecia areata, scalp:   Regrowth noted  -tincture of time  -reviewed good hair nutrition and avoid trauma  -f/u PRN    Androgenetic alopecia, scalp, likely;  -reviewed dx/tx  -rogaine for men topical Qday, instructions for use reviewed  -to consider spironolactone/propecia but would not recommend in setting of hyperprolactinemia. Would review with Endocrine first  -f/u PRN      I have reviewed medications relevant to my specialty.

## 2021-10-08 ENCOUNTER — HOSPITAL ENCOUNTER (OUTPATIENT)
Dept: LAB | Facility: MEDICAL CENTER | Age: 41
End: 2021-10-08
Attending: INTERNAL MEDICINE
Payer: COMMERCIAL

## 2021-10-08 DIAGNOSIS — E55.9 VITAMIN D DEFICIENCY: ICD-10-CM

## 2021-10-08 DIAGNOSIS — E22.1 HYPERPROLACTINEMIA (HCC): ICD-10-CM

## 2021-10-08 DIAGNOSIS — E03.9 ACQUIRED HYPOTHYROIDISM: ICD-10-CM

## 2021-10-08 DIAGNOSIS — L63.9 ALOPECIA AREATA: ICD-10-CM

## 2021-10-08 DIAGNOSIS — L65.9 HAIR LOSS: ICD-10-CM

## 2021-10-08 DIAGNOSIS — E06.3 HASHIMOTO'S THYROIDITIS: ICD-10-CM

## 2021-10-08 LAB
25(OH)D3 SERPL-MCNC: 50 NG/ML (ref 30–100)
ALBUMIN SERPL BCP-MCNC: 4.4 G/DL (ref 3.2–4.9)
ALBUMIN/GLOB SERPL: 1.5 G/DL
ALP SERPL-CCNC: 78 U/L (ref 30–99)
ALT SERPL-CCNC: 18 U/L (ref 2–50)
ANION GAP SERPL CALC-SCNC: 9 MMOL/L (ref 7–16)
AST SERPL-CCNC: 22 U/L (ref 12–45)
BASOPHILS # BLD AUTO: 0.9 % (ref 0–1.8)
BASOPHILS # BLD: 0.07 K/UL (ref 0–0.12)
BILIRUB SERPL-MCNC: 0.6 MG/DL (ref 0.1–1.5)
BUN SERPL-MCNC: 12 MG/DL (ref 8–22)
CALCIUM SERPL-MCNC: 9.1 MG/DL (ref 8.5–10.5)
CHLORIDE SERPL-SCNC: 104 MMOL/L (ref 96–112)
CO2 SERPL-SCNC: 25 MMOL/L (ref 20–33)
CREAT SERPL-MCNC: 0.75 MG/DL (ref 0.5–1.4)
EOSINOPHIL # BLD AUTO: 0.05 K/UL (ref 0–0.51)
EOSINOPHIL NFR BLD: 0.7 % (ref 0–6.9)
ERYTHROCYTE [DISTWIDTH] IN BLOOD BY AUTOMATED COUNT: 42.4 FL (ref 35.9–50)
FERRITIN SERPL-MCNC: 19.4 NG/ML (ref 10–291)
GLOBULIN SER CALC-MCNC: 3 G/DL (ref 1.9–3.5)
GLUCOSE SERPL-MCNC: 97 MG/DL (ref 65–99)
HCT VFR BLD AUTO: 39.8 % (ref 37–47)
HGB BLD-MCNC: 13.5 G/DL (ref 12–16)
IMM GRANULOCYTES # BLD AUTO: 0.02 K/UL (ref 0–0.11)
IMM GRANULOCYTES NFR BLD AUTO: 0.3 % (ref 0–0.9)
IRON SATN MFR SERPL: 13 % (ref 15–55)
IRON SERPL-MCNC: 51 UG/DL (ref 40–170)
LYMPHOCYTES # BLD AUTO: 2.05 K/UL (ref 1–4.8)
LYMPHOCYTES NFR BLD: 27.4 % (ref 22–41)
MCH RBC QN AUTO: 29 PG (ref 27–33)
MCHC RBC AUTO-ENTMCNC: 33.9 G/DL (ref 33.6–35)
MCV RBC AUTO: 85.4 FL (ref 81.4–97.8)
MONOCYTES # BLD AUTO: 0.35 K/UL (ref 0–0.85)
MONOCYTES NFR BLD AUTO: 4.7 % (ref 0–13.4)
NEUTROPHILS # BLD AUTO: 4.93 K/UL (ref 2–7.15)
NEUTROPHILS NFR BLD: 66 % (ref 44–72)
NRBC # BLD AUTO: 0 K/UL
NRBC BLD-RTO: 0 /100 WBC
PLATELET # BLD AUTO: 396 K/UL (ref 164–446)
PMV BLD AUTO: 9.2 FL (ref 9–12.9)
POTASSIUM SERPL-SCNC: 3.8 MMOL/L (ref 3.6–5.5)
PROLACTIN SERPL-MCNC: 4.28 NG/ML (ref 2.8–26)
PROT SERPL-MCNC: 7.4 G/DL (ref 6–8.2)
RBC # BLD AUTO: 4.66 M/UL (ref 4.2–5.4)
SODIUM SERPL-SCNC: 138 MMOL/L (ref 135–145)
T4 FREE SERPL-MCNC: 1.33 NG/DL (ref 0.93–1.7)
TIBC SERPL-MCNC: 387 UG/DL (ref 250–450)
TSH SERPL DL<=0.005 MIU/L-ACNC: 0.58 UIU/ML (ref 0.38–5.33)
UIBC SERPL-MCNC: 336 UG/DL (ref 110–370)
WBC # BLD AUTO: 7.5 K/UL (ref 4.8–10.8)

## 2021-10-08 PROCEDURE — 84439 ASSAY OF FREE THYROXINE: CPT

## 2021-10-08 PROCEDURE — 83550 IRON BINDING TEST: CPT

## 2021-10-08 PROCEDURE — 80053 COMPREHEN METABOLIC PANEL: CPT

## 2021-10-08 PROCEDURE — 84146 ASSAY OF PROLACTIN: CPT

## 2021-10-08 PROCEDURE — 36415 COLL VENOUS BLD VENIPUNCTURE: CPT

## 2021-10-08 PROCEDURE — 83540 ASSAY OF IRON: CPT

## 2021-10-08 PROCEDURE — 84443 ASSAY THYROID STIM HORMONE: CPT

## 2021-10-08 PROCEDURE — 82306 VITAMIN D 25 HYDROXY: CPT

## 2021-10-08 PROCEDURE — 85025 COMPLETE CBC W/AUTO DIFF WBC: CPT

## 2021-10-08 PROCEDURE — 84305 ASSAY OF SOMATOMEDIN: CPT

## 2021-10-08 PROCEDURE — 82728 ASSAY OF FERRITIN: CPT

## 2021-10-11 LAB
IGF-I SERPL-MCNC: 190 NG/ML (ref 78–274)
IGF-I Z-SCORE SERPL: 0.7

## 2021-10-19 ENCOUNTER — OFFICE VISIT (OUTPATIENT)
Dept: ENDOCRINOLOGY | Facility: MEDICAL CENTER | Age: 41
End: 2021-10-19
Attending: INTERNAL MEDICINE
Payer: COMMERCIAL

## 2021-10-19 ENCOUNTER — OFFICE VISIT (OUTPATIENT)
Dept: MEDICAL GROUP | Facility: MEDICAL CENTER | Age: 41
End: 2021-10-19
Payer: COMMERCIAL

## 2021-10-19 ENCOUNTER — HOSPITAL ENCOUNTER (OUTPATIENT)
Facility: MEDICAL CENTER | Age: 41
End: 2021-10-19
Attending: PHYSICIAN ASSISTANT
Payer: COMMERCIAL

## 2021-10-19 VITALS
TEMPERATURE: 96.5 F | HEIGHT: 62 IN | OXYGEN SATURATION: 96 % | DIASTOLIC BLOOD PRESSURE: 68 MMHG | SYSTOLIC BLOOD PRESSURE: 100 MMHG | WEIGHT: 141.2 LBS | BODY MASS INDEX: 25.98 KG/M2 | HEART RATE: 81 BPM

## 2021-10-19 VITALS
OXYGEN SATURATION: 96 % | HEART RATE: 96 BPM | RESPIRATION RATE: 16 BRPM | DIASTOLIC BLOOD PRESSURE: 60 MMHG | SYSTOLIC BLOOD PRESSURE: 112 MMHG | HEIGHT: 62 IN | WEIGHT: 141.8 LBS | BODY MASS INDEX: 26.09 KG/M2

## 2021-10-19 DIAGNOSIS — Z12.31 ENCOUNTER FOR SCREENING MAMMOGRAM FOR MALIGNANT NEOPLASM OF BREAST: ICD-10-CM

## 2021-10-19 DIAGNOSIS — E22.1 HYPERPROLACTINEMIA (HCC): ICD-10-CM

## 2021-10-19 DIAGNOSIS — Z00.00 WELLNESS EXAMINATION: ICD-10-CM

## 2021-10-19 DIAGNOSIS — R39.15 URINARY URGENCY: ICD-10-CM

## 2021-10-19 DIAGNOSIS — E06.3 HASHIMOTO'S THYROIDITIS: ICD-10-CM

## 2021-10-19 DIAGNOSIS — E03.9 ACQUIRED HYPOTHYROIDISM: ICD-10-CM

## 2021-10-19 DIAGNOSIS — M25.552 BILATERAL HIP PAIN: ICD-10-CM

## 2021-10-19 DIAGNOSIS — L63.9 ALOPECIA AREATA: ICD-10-CM

## 2021-10-19 DIAGNOSIS — E61.1 IRON DEFICIENCY: ICD-10-CM

## 2021-10-19 DIAGNOSIS — Z23 NEED FOR VACCINATION: ICD-10-CM

## 2021-10-19 DIAGNOSIS — M25.551 BILATERAL HIP PAIN: ICD-10-CM

## 2021-10-19 DIAGNOSIS — L65.9 HAIR LOSS: ICD-10-CM

## 2021-10-19 DIAGNOSIS — Z12.4 SCREENING FOR CERVICAL CANCER: ICD-10-CM

## 2021-10-19 DIAGNOSIS — N94.6 PAINFUL MENSTRUAL PERIODS: ICD-10-CM

## 2021-10-19 DIAGNOSIS — E55.9 VITAMIN D DEFICIENCY: ICD-10-CM

## 2021-10-19 PROCEDURE — 99214 OFFICE O/P EST MOD 30 MIN: CPT | Performed by: INTERNAL MEDICINE

## 2021-10-19 PROCEDURE — 99395 PREV VISIT EST AGE 18-39: CPT | Mod: 25 | Performed by: PHYSICIAN ASSISTANT

## 2021-10-19 PROCEDURE — 88175 CYTOPATH C/V AUTO FLUID REDO: CPT

## 2021-10-19 PROCEDURE — 90686 IIV4 VACC NO PRSV 0.5 ML IM: CPT | Performed by: PHYSICIAN ASSISTANT

## 2021-10-19 PROCEDURE — 81001 URINALYSIS AUTO W/SCOPE: CPT

## 2021-10-19 PROCEDURE — 99211 OFF/OP EST MAY X REQ PHY/QHP: CPT | Performed by: INTERNAL MEDICINE

## 2021-10-19 PROCEDURE — 90471 IMMUNIZATION ADMIN: CPT | Performed by: PHYSICIAN ASSISTANT

## 2021-10-19 ASSESSMENT — FIBROSIS 4 INDEX
FIB4 SCORE: .5106882308569509898
FIB4 SCORE: .5106882308569509898

## 2021-10-19 NOTE — PROGRESS NOTES
Chief Complaint: Follow up for Primary Hypothyroidism secondary to Hashimoto's thyroiditis, idiopathic hyperprolactinemia,    HPI:     Yun Clements is a 40 y.o. female here for follow up of the above medical issue    She has a history of hyperprolactinemia with baseline prolactin levels of greater than 70 diagnosed in [id number] with baseline MRI in 2019 showing no pituitary growth or abnormality    She is currently on cabergoline 0.5 mg 1 tablet twice a week which has been her medication for over 6 months.   She reports regular menstrual cycles and denies breast swelling and breast tenderness or nipple discharge.  She denies headaches and blurring of vision.    Prolactin is controlled at 4.28 on October 8, 2021  Previous prolactin was 5.5 on June 2021    Currently she wants to defer getting an MRI this year because of personal reasons    With regards to her primary hypothyroidism she has Hashimoto's thyroiditis with baseline elevated TPO antibodies of 400 back in 2018 or 2019.  TSH levels have been elevated in the past greater than 5    She is currently on Synthroid 88 mcg daily.  She prefers brand-name   She reports feeling fair  She denies palpitations and tremors  TSH is normal at 0.58 with a free T4 of 1.3 on October 2021        Incidentally she reports hair loss.  She showed me in nonscarring, oval patch on her scalp located behind her right ear and this is compatible with alopecia areata    I explained to her that thyroid disorders are associated with this and other autoimmune conditions but they are not necessarily the etiologic factor    She saw a dermatologist with [facility]  Unfortunately she reports that she was not happy with the encounter  Incidentally the nonscarred oval patchy region of hair loss behind her right ear has resolved and hair has grown back  I did conduct a work-up and found that her iron saturation levels are low and she does have iron deficiency which can contribute to hair  "loss    Her iron saturation is low at 13% ferritin is low at 19 on October 2021    Patient's medications, allergies, and social histories were reviewed and updated as appropriate.      ROS:     CONS:     No fever, no chills   EYES:     No diplopia, no blurry vision   CV:           No chest pain, no palpitations   PULM:     No SOB, no cough, no hemoptysis.   GI:            No nausea, no vomiting, no diarrhea, no constipation   ENDO:     No polyuria, no polydipsia, no heat intolerance, no cold intolerance       Past Medical History:  Problem List:  2021-06: Hashimoto's thyroiditis  2021-06: Alopecia areata  2020-10: Hair loss  2020-10: Painful menstrual periods  2020-09: Acquired hypothyroidism  2020-09: Vitamin D deficiency  2018-11: Hyperprolactinemia (HCC)  2018-09: Abnormal menstrual periods  2018-09: Acute bilateral low back pain without sciatica  2018-09: Breast discharge  2018-09: Chronic tension-type headache, not intractable      Past Surgical History:  Past Surgical History:   Procedure Laterality Date   • EYE SURGERY          Allergies:  Patient has no known allergies.     Social History:  Social History     Tobacco Use   • Smoking status: Never Smoker   • Smokeless tobacco: Never Used   Vaping Use   • Vaping Use: Never used   Substance Use Topics   • Alcohol use: Yes     Alcohol/week: 0.6 oz     Types: 1 Glasses of wine per week     Comment: rarely, once a month   • Drug use: No        Family History:   family history includes Diabetes in her maternal grandfather and mother.      PHYSICAL EXAM:   Vital signs: /60 (BP Location: Left arm, Patient Position: Sitting, BP Cuff Size: Adult)   Pulse 96   Resp 16   Ht 1.575 m (5' 2\")   Wt 64.3 kg (141 lb 12.8 oz)   SpO2 96%   BMI 25.94 kg/m²   GENERAL: Well-developed, well-nourished in no apparent distress.   EYE:  No ocular asymmetry, PERRLA  HENT: Pink, moist mucous membranes.    NECK: No thyromegaly.   CARDIOVASCULAR:  No murmurs  LUNGS: Clear breath " sounds  ABDOMEN: Soft, nontender   EXTREMITIES: No clubbing, cyanosis, or edema.   NEUROLOGICAL: No gross focal motor abnormalities   LYMPH: No cervical adenopathy palpated.   SKIN: oval-shaped circumscribed nonscarred area of scalp behind right ear with no visible hair growth      Labs:  Lab Results   Component Value Date/Time    SODIUM 138 10/08/2021 11:25 AM    POTASSIUM 3.8 10/08/2021 11:25 AM    CHLORIDE 104 10/08/2021 11:25 AM    CO2 25 10/08/2021 11:25 AM    ANION 9.0 10/08/2021 11:25 AM    GLUCOSE 97 10/08/2021 11:25 AM    BUN 12 10/08/2021 11:25 AM    CREATININE 0.75 10/08/2021 11:25 AM    CALCIUM 9.1 10/08/2021 11:25 AM    ASTSGOT 22 10/08/2021 11:25 AM    ALTSGPT 18 10/08/2021 11:25 AM    TBILIRUBIN 0.6 10/08/2021 11:25 AM    ALBUMIN 4.4 10/08/2021 11:25 AM    TOTPROTEIN 7.4 10/08/2021 11:25 AM    GLOBULIN 3.0 10/08/2021 11:25 AM    AGRATIO 1.5 10/08/2021 11:25 AM       Lab Results   Component Value Date/Time    SODIUM 139 06/19/2021 0727    POTASSIUM 4.2 06/19/2021 0727    CHLORIDE 105 06/19/2021 0727    CO2 23 06/19/2021 0727    GLUCOSE 95 06/19/2021 0727    BUN 11 06/19/2021 0727    CREATININE 0.81 06/19/2021 0727    CALCIUM 9.0 06/19/2021 0727    ANION 11.0 06/19/2021 0727       Lab Results   Component Value Date/Time    CHOLSTRLTOT 139 12/17/2020 0705    TRIGLYCERIDE 84 12/17/2020 0705    HDL 54 12/17/2020 0705    LDL 68 12/17/2020 0705       Lab Results   Component Value Date/Time    TSHULTRASEN 0.130 (L) 06/19/2021 0727     Lab Results   Component Value Date/Time    FREET4 1.51 06/19/2021 0727     Lab Results   Component Value Date/Time    FREET3 2.65 06/19/2021 0727     No results found for: THYSTIMIG    Lab Results   Component Value Date/Time    MICROSOMALA 54.7 (H) 03/03/2020 0626         Imaging:      ASSESSMENT/PLAN:     1. Acquired hypothyroidism  Controlled   Continue Synthroid  88 mcg daily  Follow-up in 6 months with repeat TSH    2. Hashimoto's thyroiditis  This is the etiology of her  hypothyroidism    3. Hyperprolactinemia (HCC)  Controlled  She has idiopathic hyperprolactinemia with negative MRI  Continue cabergoline 0.5 mg twice a week  Follow-up in 6 months with repeat prolactin    4. Vitamin D deficiency  Controlled  Vitamin D is well controlled at 50  Continue ergocalciferol  Repeat calcium and vitamin D levels in 3 to 6 months    5. Hair loss  Multifactorial she has alopecia areata  And she also has iron deficiency recommend she start iron supplements  Consider spironolactone in the future  Repeat labs for iron in 6 months    6. Alopecia areata  This is the etiology of her hair loss  I explained to her that other autoimmune conditions are associated with this but they are not necessarily the etiologic factors  Patient informed today that she does not want to return to dermatology\incidentally the patchy oval nonscarring hair loss on behind her right ear is back to normal      Return in about 6 months (around 4/19/2022).      Thank you kindly for allowing me to participate in the thyroid care plan for this patient.    Cresencio Horan MD, [facility], [facility]  06/25/21    CC:   Michell Pires P.A.-C.

## 2021-10-20 DIAGNOSIS — Z12.4 SCREENING FOR CERVICAL CANCER: ICD-10-CM

## 2021-10-20 PROBLEM — N64.52 BREAST DISCHARGE: Status: RESOLVED | Noted: 2018-09-05 | Resolved: 2021-10-20

## 2021-10-20 PROBLEM — R39.15 URINARY URGENCY: Status: ACTIVE | Noted: 2021-10-20

## 2021-10-20 PROBLEM — M25.552 BILATERAL HIP PAIN: Status: ACTIVE | Noted: 2021-10-20

## 2021-10-20 PROBLEM — M25.551 BILATERAL HIP PAIN: Status: ACTIVE | Noted: 2021-10-20

## 2021-10-20 LAB
APPEARANCE UR: CLEAR
BACTERIA #/AREA URNS HPF: NEGATIVE /HPF
BILIRUB UR QL STRIP.AUTO: NEGATIVE
COLOR UR: YELLOW
CYTOLOGY REG CYTOL: NORMAL
EPI CELLS #/AREA URNS HPF: NEGATIVE /HPF
GLUCOSE UR STRIP.AUTO-MCNC: NEGATIVE MG/DL
HYALINE CASTS #/AREA URNS LPF: ABNORMAL /LPF
KETONES UR STRIP.AUTO-MCNC: ABNORMAL MG/DL
LEUKOCYTE ESTERASE UR QL STRIP.AUTO: ABNORMAL
MICRO URNS: ABNORMAL
NITRITE UR QL STRIP.AUTO: NEGATIVE
PH UR STRIP.AUTO: 6.5 [PH] (ref 5–8)
PROT UR QL STRIP: NEGATIVE MG/DL
RBC # URNS HPF: ABNORMAL /HPF
RBC UR QL AUTO: ABNORMAL
SP GR UR STRIP.AUTO: 1.01
UROBILINOGEN UR STRIP.AUTO-MCNC: 0.2 MG/DL
WBC #/AREA URNS HPF: ABNORMAL /HPF

## 2021-10-20 NOTE — PROGRESS NOTES
SUBJECTIVE: 40 y.o. female for annual routine pap and checkup.  Chief Complaint   Patient presents with   • Gynecologic Exam     Hair loss  Chronic, recurrent issue. Saw a dermatologist who said it was likely just normal aging, recommended rogaine. Endocrinology said not d/t thyroid disease. Patient would like to see a different derm for second opinion.     Hashimoto's thyroiditis  Chronic, stable on current, managed with endocrionology    Hyperprolactinemia (HCC)  Chronic, stable, asymptomatic on current cabergoline    Painful menstrual periods  Chronic, doesn't want to be on birth control    Urinary urgency  New, discomfort at the end of urination    Bilateral hip pain  Few months. Pain as well as numbness sides of thighs. No known cause        Last Pap: 2018   Contraception: none  H/O Abnormal Pap yes - ASCUS one time, normal biopsy, normal paps after that  H/O STI no       LMP: Patient's last menstrual period was 10/04/2021.    Allergies: Patient has no known allergies.     ROS:    No breast tenderness, mass, nipple discharge, changes in size or contour, or abnormal cyclic discomfort.  No abdominal pain, change in bowel habits, black or bloody stools.    No unusual headaches, no visual changes.   No prolonged cough. No dyspnea or chest pain on exertion.    No skin lesions    Preventive Care:  Mammogram: due this year      Current Outpatient Medications   Medication Sig Dispense Refill   • cabergoline (DOSTINEX) 0.5 MG tablet One tab two times a week. 24 tablet 3   • vitamin D, Ergocalciferol, (DRISDOL) 1.25 MG (11786 UT) Cap capsule Take 1 capsule by mouth every 7 days. With food. 12 capsule 3   • SYNTHROID 88 MCG Tab Take 1 tablet by mouth every morning on an empty stomach. 90 tablet 3   • ibuprofen (MOTRIN) 800 MG Tab Take 1 Tab by mouth every 8 hours as needed. 20 Tab 3     No current facility-administered medications for this visit.     She  has a past medical history of Thyroid disease.  She  has a  "past surgical history that includes eye surgery.     Family History:   Family History   Problem Relation Age of Onset   • Diabetes Mother    • Diabetes Maternal Grandfather    • Cancer Neg Hx    • Heart Attack Neg Hx    • Heart Disease Neg Hx    • Stroke Neg Hx        Family History negative for : Breast, Colon, Lung, or female organ cancer     OBJECTIVE:   /68 (BP Location: Left arm, Patient Position: Sitting, BP Cuff Size: Adult)   Pulse 81   Temp 35.8 °C (96.5 °F) (Temporal)   Ht 1.575 m (5' 2\")   Wt 64 kg (141 lb 3.2 oz)   LMP 10/04/2021   SpO2 96%   Breastfeeding No   BMI 25.83 kg/m²   Body mass index is 25.83 kg/m².       Breast Exam: Performed with instruction during examination. No axillary lymphadenopathy, no skin changes, no dominant masses. No nipple retraction  Pelvic Exam - Sure Path Pap obtained and specimen sent to lab. Normal external genitalia with no lesions. Normal vaginal mucosa with normal rugation and no discharge. Cervix with no visible lesions. No cervical motion tenderness. Uterus is normal sized with no masses. No adnexal tenderness or enlargement appreciated.      <ASSESSMENT>  1. Wellness examination     2. Screening for cervical cancer  THINPREP PAP,REFLEX HPV ON ASC-US ONLY   3. Need for vaccination  INFLUENZA VACCINE QUAD INJ (PF)   4. Urinary urgency  URINALYSIS,CULTURE IF INDICATED   5. Bilateral hip pain  DX-HIP-BILATERAL-WITH PELVIS-2 VIEWS    DX-LUMBAR SPINE-2 OR 3 VIEWS   6. Encounter for screening mammogram for malignant neoplasm of breast  MA-SCREENING MAMMO BILAT W/CAD   7. Hair loss  REFERRAL TO DERMATOLOGY   8. Hashimoto's thyroiditis     9. Hyperprolactinemia (HCC)     10. Painful menstrual periods       F/u after imaging, discuss hip pain in more detail         "

## 2021-10-20 NOTE — ASSESSMENT & PLAN NOTE
Chronic, recurrent issue. Saw a dermatologist who said it was likely just normal aging, recommended rogaine. Endocrinology said not d/t thyroid disease. Patient would like to see a different derm for second opinion.

## 2021-10-22 ENCOUNTER — PATIENT MESSAGE (OUTPATIENT)
Dept: MEDICAL GROUP | Facility: MEDICAL CENTER | Age: 41
End: 2021-10-22

## 2021-10-22 ENCOUNTER — TELEPHONE (OUTPATIENT)
Dept: MEDICAL GROUP | Facility: MEDICAL CENTER | Age: 41
End: 2021-10-22

## 2021-10-22 DIAGNOSIS — N76.0 BACTERIAL VAGINOSIS: ICD-10-CM

## 2021-10-22 DIAGNOSIS — B96.89 BACTERIAL VAGINOSIS: ICD-10-CM

## 2021-10-22 NOTE — TELEPHONE ENCOUNTER
Phone Number Called: [phone] (home)       Call outcome: Did not leave a detailed message. Requested patient to call back.    Message: Called and LVM. Requested call back to discuss lab results.

## 2021-10-25 RX ORDER — METRONIDAZOLE 375 MG/1
375 CAPSULE ORAL 2 TIMES DAILY
Qty: 14 CAPSULE | Refills: 0 | Status: SHIPPED | OUTPATIENT
Start: 2021-10-25 | End: 2021-11-01

## 2022-01-20 ENCOUNTER — HOSPITAL ENCOUNTER (OUTPATIENT)
Facility: MEDICAL CENTER | Age: 42
End: 2022-01-20
Attending: NURSE PRACTITIONER
Payer: COMMERCIAL

## 2022-01-20 ENCOUNTER — NON-PROVIDER VISIT (OUTPATIENT)
Dept: OCCUPATIONAL MEDICINE | Facility: CLINIC | Age: 42
End: 2022-01-20
Payer: COMMERCIAL

## 2022-01-20 DIAGNOSIS — Z02.89 ENCOUNTER FOR OCCUPATIONAL HEALTH EXAMINATION: ICD-10-CM

## 2022-01-20 DIAGNOSIS — Z02.89 ENCOUNTER FOR OCCUPATIONAL HEALTH EXAMINATION: Primary | ICD-10-CM

## 2022-01-20 PROCEDURE — 86480 TB TEST CELL IMMUN MEASURE: CPT | Performed by: NURSE PRACTITIONER

## 2022-01-24 LAB
GAMMA INTERFERON BACKGROUND BLD IA-ACNC: 0.05 IU/ML
M TB IFN-G BLD-IMP: NEGATIVE
M TB IFN-G CD4+ BCKGRND COR BLD-ACNC: 0 IU/ML
MITOGEN IGNF BCKGRD COR BLD-ACNC: >10 IU/ML
QFT TB2 - NIL TBQ2: 0 IU/ML

## 2022-04-16 ENCOUNTER — HOSPITAL ENCOUNTER (OUTPATIENT)
Dept: LAB | Facility: MEDICAL CENTER | Age: 42
End: 2022-04-16
Attending: INTERNAL MEDICINE
Payer: COMMERCIAL

## 2022-04-16 DIAGNOSIS — L63.9 ALOPECIA AREATA: ICD-10-CM

## 2022-04-16 DIAGNOSIS — E22.1 HYPERPROLACTINEMIA (HCC): ICD-10-CM

## 2022-04-16 DIAGNOSIS — E61.1 IRON DEFICIENCY: ICD-10-CM

## 2022-04-16 DIAGNOSIS — E06.3 HASHIMOTO'S THYROIDITIS: ICD-10-CM

## 2022-04-16 DIAGNOSIS — E03.9 ACQUIRED HYPOTHYROIDISM: ICD-10-CM

## 2022-04-16 DIAGNOSIS — L65.9 HAIR LOSS: ICD-10-CM

## 2022-04-16 DIAGNOSIS — E55.9 VITAMIN D DEFICIENCY: ICD-10-CM

## 2022-04-16 LAB
25(OH)D3 SERPL-MCNC: 39 NG/ML (ref 30–100)
ALBUMIN SERPL BCP-MCNC: 4.6 G/DL (ref 3.2–4.9)
ALBUMIN/GLOB SERPL: 1.8 G/DL
ALP SERPL-CCNC: 81 U/L (ref 30–99)
ALT SERPL-CCNC: 19 U/L (ref 2–50)
ANION GAP SERPL CALC-SCNC: 12 MMOL/L (ref 7–16)
AST SERPL-CCNC: 18 U/L (ref 12–45)
BILIRUB SERPL-MCNC: 0.4 MG/DL (ref 0.1–1.5)
BUN SERPL-MCNC: 15 MG/DL (ref 8–22)
CALCIUM SERPL-MCNC: 9.2 MG/DL (ref 8.5–10.5)
CHLORIDE SERPL-SCNC: 106 MMOL/L (ref 96–112)
CO2 SERPL-SCNC: 22 MMOL/L (ref 20–33)
CREAT SERPL-MCNC: 0.74 MG/DL (ref 0.5–1.4)
FERRITIN SERPL-MCNC: 20 NG/ML (ref 10–291)
GFR SERPLBLD CREATININE-BSD FMLA CKD-EPI: 105 ML/MIN/1.73 M 2
GLOBULIN SER CALC-MCNC: 2.5 G/DL (ref 1.9–3.5)
GLUCOSE SERPL-MCNC: 103 MG/DL (ref 65–99)
IRON SATN MFR SERPL: 14 % (ref 15–55)
IRON SERPL-MCNC: 49 UG/DL (ref 40–170)
POTASSIUM SERPL-SCNC: 4.5 MMOL/L (ref 3.6–5.5)
PROLACTIN SERPL-MCNC: 4.61 NG/ML (ref 2.8–26)
PROT SERPL-MCNC: 7.1 G/DL (ref 6–8.2)
SODIUM SERPL-SCNC: 140 MMOL/L (ref 135–145)
T4 FREE SERPL-MCNC: 1.57 NG/DL (ref 0.93–1.7)
TIBC SERPL-MCNC: 362 UG/DL (ref 250–450)
TSH SERPL DL<=0.005 MIU/L-ACNC: 0.63 UIU/ML (ref 0.38–5.33)
UIBC SERPL-MCNC: 313 UG/DL (ref 110–370)

## 2022-04-16 PROCEDURE — 80053 COMPREHEN METABOLIC PANEL: CPT

## 2022-04-16 PROCEDURE — 84439 ASSAY OF FREE THYROXINE: CPT

## 2022-04-16 PROCEDURE — 83540 ASSAY OF IRON: CPT

## 2022-04-16 PROCEDURE — 84146 ASSAY OF PROLACTIN: CPT

## 2022-04-16 PROCEDURE — 84443 ASSAY THYROID STIM HORMONE: CPT

## 2022-04-16 PROCEDURE — 82728 ASSAY OF FERRITIN: CPT

## 2022-04-16 PROCEDURE — 36415 COLL VENOUS BLD VENIPUNCTURE: CPT

## 2022-04-16 PROCEDURE — 83550 IRON BINDING TEST: CPT

## 2022-04-16 PROCEDURE — 82306 VITAMIN D 25 HYDROXY: CPT

## 2022-04-22 ENCOUNTER — OFFICE VISIT (OUTPATIENT)
Dept: ENDOCRINOLOGY | Facility: MEDICAL CENTER | Age: 42
End: 2022-04-22
Attending: INTERNAL MEDICINE
Payer: COMMERCIAL

## 2022-04-22 ENCOUNTER — TELEPHONE (OUTPATIENT)
Dept: MEDICAL GROUP | Facility: MEDICAL CENTER | Age: 42
End: 2022-04-22

## 2022-04-22 VITALS
BODY MASS INDEX: 26.5 KG/M2 | OXYGEN SATURATION: 99 % | DIASTOLIC BLOOD PRESSURE: 70 MMHG | HEART RATE: 70 BPM | WEIGHT: 144 LBS | SYSTOLIC BLOOD PRESSURE: 108 MMHG | HEIGHT: 62 IN

## 2022-04-22 DIAGNOSIS — E06.3 HASHIMOTO'S THYROIDITIS: ICD-10-CM

## 2022-04-22 DIAGNOSIS — E61.1 IRON DEFICIENCY: ICD-10-CM

## 2022-04-22 DIAGNOSIS — E22.1 HYPERPROLACTINEMIA (HCC): ICD-10-CM

## 2022-04-22 DIAGNOSIS — E55.9 VITAMIN D DEFICIENCY: ICD-10-CM

## 2022-04-22 DIAGNOSIS — E03.9 ACQUIRED HYPOTHYROIDISM: ICD-10-CM

## 2022-04-22 DIAGNOSIS — L65.9 HAIR LOSS: ICD-10-CM

## 2022-04-22 PROCEDURE — 99214 OFFICE O/P EST MOD 30 MIN: CPT | Performed by: INTERNAL MEDICINE

## 2022-04-22 PROCEDURE — 99212 OFFICE O/P EST SF 10 MIN: CPT | Performed by: INTERNAL MEDICINE

## 2022-04-22 RX ORDER — LEVOTHYROXINE SODIUM 88 MCG
88 TABLET ORAL
Qty: 90 TABLET | Refills: 3 | Status: SHIPPED | OUTPATIENT
Start: 2022-04-22 | End: 2023-05-25 | Stop reason: SDUPTHER

## 2022-04-22 RX ORDER — CABERGOLINE 0.5 MG/1
TABLET ORAL
Qty: 24 TABLET | Refills: 3 | Status: SHIPPED | OUTPATIENT
Start: 2022-04-22 | End: 2023-05-25 | Stop reason: SDUPTHER

## 2022-04-22 RX ORDER — ERGOCALCIFEROL 1.25 MG/1
50000 CAPSULE ORAL
Qty: 12 CAPSULE | Refills: 3 | Status: SHIPPED | OUTPATIENT
Start: 2022-04-22 | End: 2023-09-02 | Stop reason: SDUPTHER

## 2022-04-22 ASSESSMENT — FIBROSIS 4 INDEX: FIB4 SCORE: 0.42

## 2022-04-22 ASSESSMENT — PATIENT HEALTH QUESTIONNAIRE - PHQ9: CLINICAL INTERPRETATION OF PHQ2 SCORE: 0

## 2022-04-22 NOTE — PROGRESS NOTES
Chief Complaint: Follow up for Primary Hypothyroidism secondary to Hashimoto's thyroiditis, idiopathic hyperprolactinemia,    HPI:     Yun Clements is a 41 y.o. female here for follow up of the above medical issue    She has a history of hyperprolactinemia with baseline prolactin levels of greater than 70 diagnosed in [id number] with baseline MRI in 2019 showing no pituitary growth or abnormality.  She was symptomatic with irregular cycles, HAs and bilateral galactorrhea         She is currently on cabergoline 0.5 mg 1 tablet twice a week which has been her medication for over 12 months.   She reports regular menstrual cycles and denies breast swelling and breast tenderness or nipple discharge.  She denies headaches and blurring of vision.    Most recent prolactin is normal at 4.61 on April 16, 2022  Previous prolactin was 4.28 on October 8, 2021  Previous prolactin was 5.5 on June 2021          With regards to her primary hypothyroidism she has Hashimoto's thyroiditis with baseline elevated TPO antibodies of 400 back in 2018 or 2019.  Previous TSH levels were greater than 5 at baseline.    She is currently on Synthroid 88 mcg daily.    She prefers brand-name   She reports feeling good  She denies constipation and cold intolerance  She denies palpitations and tremors  TSH is normal at 0.63 on April 16, 2022  TSH was 0.58 with a free T4 of 1.3 on October 2021        She also has a history of hair loss that was confirmed to be secondary to iron deficiency  Her iron saturation was low at 13% ferritin is low at 19 on October 2021  She admits to not taking iron supplements regularly because of constipation  Her most recent iron saturation is unimproved at 14% and ferritin is still low at 20 on April 16, 2022          Patient's medications, allergies, and social histories were reviewed and updated as appropriate.      ROS:     CONS:     No fever, no chills   EYES:     No diplopia, no blurry vision   CV:            "No chest pain, no palpitations   PULM:     No SOB, no cough, no hemoptysis.   GI:            No nausea, no vomiting, no diarrhea, no constipation   ENDO:     No polyuria, no polydipsia, no heat intolerance, no cold intolerance       Past Medical History:  Problem List:  2022-04: Iron deficiency  2021-10: Urinary urgency  2021-10: Bilateral hip pain  2021-06: Hashimoto's thyroiditis  2021-06: Alopecia areata  2020-10: Hair loss  2020-10: Painful menstrual periods  2020-09: Acquired hypothyroidism  2020-09: Vitamin D deficiency  2018-11: Hyperprolactinemia (HCC)  2018-09: Abnormal menstrual periods  2018-09: Acute bilateral low back pain without sciatica  2018-09: Breast discharge  2018-09: Chronic tension-type headache, not intractable      Past Surgical History:  Past Surgical History:   Procedure Laterality Date   • EYE SURGERY          Allergies:  Patient has no known allergies.     Social History:  Social History     Tobacco Use   • Smoking status: Never Smoker   • Smokeless tobacco: Never Used   Vaping Use   • Vaping Use: Never used   Substance Use Topics   • Alcohol use: Yes     Alcohol/week: 0.6 oz     Types: 1 Glasses of wine per week     Comment: rarely, once a month   • Drug use: No        Family History:   family history includes Diabetes in her maternal grandfather and mother.      PHYSICAL EXAM:   Vital signs: /70   Pulse 70   Ht 1.575 m (5' 2\")   Wt 65.3 kg (144 lb)   SpO2 99%   BMI 26.34 kg/m²   GENERAL: Well-developed, well-nourished in no apparent distress.   EYE:  No ocular asymmetry, PERRLA  HENT: Pink, moist mucous membranes.    NECK: No thyromegaly.   CARDIOVASCULAR:  No murmurs  LUNGS: Clear breath sounds  ABDOMEN: Soft, nontender   EXTREMITIES: No clubbing, cyanosis, or edema.   NEUROLOGICAL: No gross focal motor abnormalities   LYMPH: No cervical adenopathy palpated.   SKIN: oval-shaped circumscribed nonscarred area of scalp behind right ear with no visible hair " growth      Labs:  Lab Results   Component Value Date/Time    SODIUM 140 04/16/2022 07:06 AM    POTASSIUM 4.5 04/16/2022 07:06 AM    CHLORIDE 106 04/16/2022 07:06 AM    CO2 22 04/16/2022 07:06 AM    ANION 12.0 04/16/2022 07:06 AM    GLUCOSE 103 (H) 04/16/2022 07:06 AM    BUN 15 04/16/2022 07:06 AM    CREATININE 0.74 04/16/2022 07:06 AM    CALCIUM 9.2 04/16/2022 07:06 AM    ASTSGOT 18 04/16/2022 07:06 AM    ALTSGPT 19 04/16/2022 07:06 AM    TBILIRUBIN 0.4 04/16/2022 07:06 AM    ALBUMIN 4.6 04/16/2022 07:06 AM    TOTPROTEIN 7.1 04/16/2022 07:06 AM    GLOBULIN 2.5 04/16/2022 07:06 AM    AGRATIO 1.8 04/16/2022 07:06 AM       Lab Results   Component Value Date/Time    SODIUM 139 06/19/2021 0727    POTASSIUM 4.2 06/19/2021 0727    CHLORIDE 105 06/19/2021 0727    CO2 23 06/19/2021 0727    GLUCOSE 95 06/19/2021 0727    BUN 11 06/19/2021 0727    CREATININE 0.81 06/19/2021 0727    CALCIUM 9.0 06/19/2021 0727    ANION 11.0 06/19/2021 0727       Lab Results   Component Value Date/Time    CHOLSTRLTOT 139 12/17/2020 0705    TRIGLYCERIDE 84 12/17/2020 0705    HDL 54 12/17/2020 0705    LDL 68 12/17/2020 0705       Lab Results   Component Value Date/Time    TSHULTRASEN 0.130 (L) 06/19/2021 0727     Lab Results   Component Value Date/Time    FREET4 1.51 06/19/2021 0727     Lab Results   Component Value Date/Time    FREET3 2.65 06/19/2021 0727     No results found for: THYSTIMIG    Lab Results   Component Value Date/Time    MICROSOMALA 54.7 (H) 03/03/2020 0626         Imaging:      ASSESSMENT/PLAN:     1. Acquired hypothyroidism  Controlled   Continue Synthroid  88 mcg daily  Follow-up in 6 months with repeat TSH    2. Hashimoto's thyroiditis  This is the etiology of her hypothyroidism    3. Hyperprolactinemia (HCC)  Controlled  She has idiopathic hyperprolactinemia with negative MRI  Continue cabergoline 0.5 mg twice a week  Follow-up in 6 months with repeat prolactin    4. Hair loss  Improving  I would recommend continued follow-up  with her primary care for this issue    5. Iron deficiency  Unimproved  Recommend better compliance with iron supplementation  I recommend follow-up with her primary care for this issue I will repeat her iron levels again 1 last time in 6 months      Return in about 6 months (around 10/22/2022).      Thank you kindly for allowing me to participate in the thyroid care plan for this patient.    Cresencio Horan MD, [facility], [facility]  06/25/21    CC:   Michell Pires P.A.-C.

## 2022-04-22 NOTE — TELEPHONE ENCOUNTER
1. Caller Name: Yun Clements                          Call Back Number: [phone] (home)       How would the patient prefer to be contacted with a response: Phone call OK to leave a detailed message    2. SPECIFIC Action To Be Taken: Referral pending, please sign.    3. Diagnosis/Clinical Reason for Request: Hashimoto's thyroiditis    4. Specialty & Provider Name/Lab/Imaging Location: [facility]    5. Is appointment scheduled for requested order/referral: no    Patient was not informed they will receive a return phone call from the office ONLY if there are any questions before processing their request. Advised to call back if they haven't received a call from the referral department in 5 days.    Updated referral

## 2022-05-23 ENCOUNTER — TELEPHONE (OUTPATIENT)
Dept: ENDOCRINOLOGY | Facility: MEDICAL CENTER | Age: 42
End: 2022-05-23

## 2022-05-23 NOTE — TELEPHONE ENCOUNTER
Patient called and wants to schedule an MRI per pt you spoke to her about it at her last appointment. Please contact patient at [phone].

## 2022-06-03 ENCOUNTER — TELEPHONE (OUTPATIENT)
Dept: ENDOCRINOLOGY | Facility: MEDICAL CENTER | Age: 42
End: 2022-06-03
Payer: COMMERCIAL

## 2022-06-03 DIAGNOSIS — E22.1 HYPERPROLACTINEMIA (HCC): ICD-10-CM

## 2022-06-03 NOTE — TELEPHONE ENCOUNTER
Patient is calling to request for the MRI order, she states she would like to go ahead and proceed. But her insurance will be changing to [employer] next month and if you had any advice on this.   I did advise patient that once the order is placed she would need to contact her insurance to find out where it is accepted to have the MRI completed.

## 2022-06-24 ENCOUNTER — HOSPITAL ENCOUNTER (OUTPATIENT)
Dept: RADIOLOGY | Facility: MEDICAL CENTER | Age: 42
End: 2022-06-24
Attending: INTERNAL MEDICINE
Payer: COMMERCIAL

## 2022-06-24 DIAGNOSIS — E22.1 HYPERPROLACTINEMIA (HCC): ICD-10-CM

## 2022-06-24 PROCEDURE — 700117 HCHG RX CONTRAST REV CODE 255: Performed by: INTERNAL MEDICINE

## 2022-06-24 PROCEDURE — 70553 MRI BRAIN STEM W/O & W/DYE: CPT

## 2022-06-24 PROCEDURE — A9576 INJ PROHANCE MULTIPACK: HCPCS | Performed by: INTERNAL MEDICINE

## 2022-06-24 RX ADMIN — GADOTERIDOL 14 ML: 279.3 INJECTION, SOLUTION INTRAVENOUS at 09:15

## 2022-11-07 ENCOUNTER — HOSPITAL ENCOUNTER (OUTPATIENT)
Dept: LAB | Facility: MEDICAL CENTER | Age: 42
End: 2022-11-07
Attending: INTERNAL MEDICINE
Payer: COMMERCIAL

## 2022-11-07 DIAGNOSIS — E55.9 VITAMIN D DEFICIENCY: ICD-10-CM

## 2022-11-07 DIAGNOSIS — E03.9 ACQUIRED HYPOTHYROIDISM: ICD-10-CM

## 2022-11-07 DIAGNOSIS — E61.1 IRON DEFICIENCY: ICD-10-CM

## 2022-11-07 DIAGNOSIS — L65.9 HAIR LOSS: ICD-10-CM

## 2022-11-07 DIAGNOSIS — E22.1 HYPERPROLACTINEMIA (HCC): ICD-10-CM

## 2022-11-07 LAB
25(OH)D3 SERPL-MCNC: 46 NG/ML (ref 30–100)
ALBUMIN SERPL BCP-MCNC: 4.8 G/DL (ref 3.2–4.9)
ALBUMIN/GLOB SERPL: 1.7 G/DL
ALP SERPL-CCNC: 75 U/L (ref 30–99)
ALT SERPL-CCNC: 16 U/L (ref 2–50)
ANION GAP SERPL CALC-SCNC: 12 MMOL/L (ref 7–16)
AST SERPL-CCNC: 19 U/L (ref 12–45)
BILIRUB SERPL-MCNC: 0.6 MG/DL (ref 0.1–1.5)
BUN SERPL-MCNC: 12 MG/DL (ref 8–22)
CALCIUM SERPL-MCNC: 9.4 MG/DL (ref 8.5–10.5)
CHLORIDE SERPL-SCNC: 101 MMOL/L (ref 96–112)
CO2 SERPL-SCNC: 22 MMOL/L (ref 20–33)
CREAT SERPL-MCNC: 0.75 MG/DL (ref 0.5–1.4)
FERRITIN SERPL-MCNC: 55.1 NG/ML (ref 10–291)
GFR SERPLBLD CREATININE-BSD FMLA CKD-EPI: 103 ML/MIN/1.73 M 2
GLOBULIN SER CALC-MCNC: 2.9 G/DL (ref 1.9–3.5)
GLUCOSE SERPL-MCNC: 86 MG/DL (ref 65–99)
IRON SATN MFR SERPL: 24 % (ref 15–55)
IRON SERPL-MCNC: 98 UG/DL (ref 40–170)
POTASSIUM SERPL-SCNC: 4.1 MMOL/L (ref 3.6–5.5)
PROLACTIN SERPL-MCNC: 5.79 NG/ML (ref 2.8–26)
PROT SERPL-MCNC: 7.7 G/DL (ref 6–8.2)
SODIUM SERPL-SCNC: 135 MMOL/L (ref 135–145)
T4 FREE SERPL-MCNC: 1.35 NG/DL (ref 0.93–1.7)
TIBC SERPL-MCNC: 410 UG/DL (ref 250–450)
TSH SERPL DL<=0.005 MIU/L-ACNC: 1.16 UIU/ML (ref 0.38–5.33)
UIBC SERPL-MCNC: 312 UG/DL (ref 110–370)

## 2022-11-07 PROCEDURE — 83540 ASSAY OF IRON: CPT

## 2022-11-07 PROCEDURE — 84146 ASSAY OF PROLACTIN: CPT

## 2022-11-07 PROCEDURE — 84443 ASSAY THYROID STIM HORMONE: CPT

## 2022-11-07 PROCEDURE — 36415 COLL VENOUS BLD VENIPUNCTURE: CPT

## 2022-11-07 PROCEDURE — 84439 ASSAY OF FREE THYROXINE: CPT

## 2022-11-07 PROCEDURE — 82306 VITAMIN D 25 HYDROXY: CPT

## 2022-11-07 PROCEDURE — 80053 COMPREHEN METABOLIC PANEL: CPT

## 2022-11-07 PROCEDURE — 83550 IRON BINDING TEST: CPT

## 2022-11-07 PROCEDURE — 82728 ASSAY OF FERRITIN: CPT

## 2022-11-16 ENCOUNTER — OFFICE VISIT (OUTPATIENT)
Dept: MEDICAL GROUP | Facility: MEDICAL CENTER | Age: 42
End: 2022-11-16
Payer: COMMERCIAL

## 2022-11-16 ENCOUNTER — OFFICE VISIT (OUTPATIENT)
Dept: ENDOCRINOLOGY | Facility: MEDICAL CENTER | Age: 42
End: 2022-11-16
Attending: INTERNAL MEDICINE
Payer: COMMERCIAL

## 2022-11-16 VITALS
SYSTOLIC BLOOD PRESSURE: 112 MMHG | HEART RATE: 80 BPM | OXYGEN SATURATION: 98 % | WEIGHT: 145 LBS | BODY MASS INDEX: 26.68 KG/M2 | TEMPERATURE: 97.5 F | DIASTOLIC BLOOD PRESSURE: 74 MMHG | HEIGHT: 62 IN

## 2022-11-16 VITALS
HEIGHT: 62 IN | OXYGEN SATURATION: 98 % | HEART RATE: 87 BPM | WEIGHT: 147 LBS | BODY MASS INDEX: 27.05 KG/M2 | SYSTOLIC BLOOD PRESSURE: 108 MMHG | DIASTOLIC BLOOD PRESSURE: 78 MMHG

## 2022-11-16 DIAGNOSIS — F43.0 STRESS REACTION: ICD-10-CM

## 2022-11-16 DIAGNOSIS — E03.9 ACQUIRED HYPOTHYROIDISM: ICD-10-CM

## 2022-11-16 DIAGNOSIS — E61.1 IRON DEFICIENCY: ICD-10-CM

## 2022-11-16 DIAGNOSIS — E22.1 HYPERPROLACTINEMIA (HCC): ICD-10-CM

## 2022-11-16 DIAGNOSIS — L65.9 HAIR LOSS: ICD-10-CM

## 2022-11-16 DIAGNOSIS — L63.9 ALOPECIA AREATA: ICD-10-CM

## 2022-11-16 DIAGNOSIS — Z23 NEED FOR VACCINATION: ICD-10-CM

## 2022-11-16 DIAGNOSIS — F43.23 ADJUSTMENT DISORDER WITH MIXED ANXIETY AND DEPRESSED MOOD: ICD-10-CM

## 2022-11-16 DIAGNOSIS — Z76.89 ENCOUNTER TO ESTABLISH CARE: ICD-10-CM

## 2022-11-16 DIAGNOSIS — E55.9 VITAMIN D DEFICIENCY: ICD-10-CM

## 2022-11-16 PROCEDURE — 90471 IMMUNIZATION ADMIN: CPT | Performed by: NURSE PRACTITIONER

## 2022-11-16 PROCEDURE — 99214 OFFICE O/P EST MOD 30 MIN: CPT | Performed by: INTERNAL MEDICINE

## 2022-11-16 PROCEDURE — 90686 IIV4 VACC NO PRSV 0.5 ML IM: CPT | Performed by: NURSE PRACTITIONER

## 2022-11-16 PROCEDURE — 99211 OFF/OP EST MAY X REQ PHY/QHP: CPT | Performed by: INTERNAL MEDICINE

## 2022-11-16 PROCEDURE — 99214 OFFICE O/P EST MOD 30 MIN: CPT | Mod: 25 | Performed by: NURSE PRACTITIONER

## 2022-11-16 ASSESSMENT — FIBROSIS 4 INDEX
FIB4 SCORE: 0.48
FIB4 SCORE: 0.48

## 2022-11-16 NOTE — ASSESSMENT & PLAN NOTE
Chronic problem.  Most recent ferritin levels improved.   Latest Reference Range & Units 11/07/22 09:47   Ferritin 10.0 - 291.0 ng/mL 55.1

## 2022-11-16 NOTE — PROGRESS NOTES
Chief Complaint   Patient presents with    Hair Loss     Dx: Alopecia Areata, has been worsening    Establish Care     Prior PCP MARCE Lopez      Yun Clements is a 41 y.o. female here to establish care and to discuss the evaluation and management of:    Hair Loss  Started two years noticed to bald spots on the side of her head.  This is since grown back however now she feels like she is having hair loss.  Symptoms her scalp feels sore.  Previously saw dermatology for this.  No treatment.  No further work-up.  She does endorse stress from her job.  Ferritin levels are stable, TSH stable.    Stress, anxiety  Work is primarily the contributing factor to this.  Interested in counseling.  Has tried other methods to help reduce her stress and anxiety.  Not interested in medication.    Acquired hypothyroidism  This is a chronic problem.  Followed by endocrinology.  Compliant with Synthroid 88 mcg daily.    Hyperprolactinemia (HCC)  Chronic problem.  Compliant with Cabergoline.  Followed by endocrinology.    Iron deficiency  Chronic problem.  Taking three times per week. Occasional constipation.  Most recent ferritin levels improved.   Latest Reference Range & Units 11/07/22 09:47   Ferritin 10.0 - 291.0 ng/mL 55.1      Chart reviewed    ROS: SEE ABOVE        Current Outpatient Medications:     cabergoline (DOSTINEX) 0.5 MG tablet, One tab two times a week. (Patient taking differently: One tab two times a week.  Indications: Disorder with Elevated Levels of Prolactin in the Blood), Disp: 24 Tablet, Rfl: 3    vitamin D2, Ergocalciferol, (DRISDOL) 1.25 MG (07431 UT) Cap capsule, Take 1 Capsule by mouth every 7 days. With food., Disp: 12 Capsule, Rfl: 3    SYNTHROID 88 MCG Tab, Take 1 Tablet by mouth every morning on an empty stomach., Disp: 90 Tablet, Rfl: 3    ibuprofen (MOTRIN) 800 MG Tab, Take 1 Tab by mouth every 8 hours as needed., Disp: 20 Tab, Rfl: 3    No Known Allergies    Past Medical History:  "  Diagnosis Date    Thyroid disease      Past Surgical History:   Procedure Laterality Date    EYE SURGERY       Family History   Problem Relation Age of Onset    Diabetes Mother     Diabetes Maternal Grandfather     Cancer Neg Hx     Heart Attack Neg Hx     Heart Disease Neg Hx     Stroke Neg Hx      Social History     Socioeconomic History    Marital status: Single     Spouse name: Not on file    Number of children: Not on file    Years of education: Not on file    Highest education level: Not on file   Occupational History    Not on file   Tobacco Use    Smoking status: Never    Smokeless tobacco: Never   Vaping Use    Vaping Use: Never used   Substance and Sexual Activity    Alcohol use: Yes     Alcohol/week: 0.6 oz     Types: 1 Glasses of wine per week     Comment: rarely, once a month    Drug use: No    Sexual activity: Yes     Partners: Male     Birth control/protection: Condom   Other Topics Concern    Not on file   Social History Narrative    Not on file     Social Determinants of Health     Financial Resource Strain: Not on file   Food Insecurity: Not on file   Transportation Needs: Not on file   Physical Activity: Not on file   Stress: Not on file   Social Connections: Not on file   Intimate Partner Violence: Not on file   Housing Stability: Not on file       Objective:     Vitals: /74 (BP Location: Right arm, Patient Position: Sitting, BP Cuff Size: Adult)   Pulse 80   Temp 36.4 °C (97.5 °F) (Temporal)   Ht 1.575 m (5' 2\")   Wt 65.8 kg (145 lb)   SpO2 98%   BMI 26.52 kg/m²      General: Alert, pleasant, NAD  HEENT:  Normocephalic.  Neck supple.  No thyromegaly or masses palpated. No cervical or supraclavicular lymphadenopathy.  Heart:  Regular rate and rhythm.  S1 and S2 normal.  No murmurs appreciated.    Respiratory:  Normal respiratory effort.  Clear to auscultation bilaterally.    Skin:  Warm, dry, no rashes  Musculoskeletal:  Gait is normal.  Moves all extremities well.  Extremities:   " No leg edema.  Neurological: No tremors  Psych:  Affect/mood is normal, judgement is good, memory is intact, grooming is appropriate.      Assessment and Plan.     41 y.o. female to establish care and discuss the followin. Hair loss  Ongoing problem for the patient.  History of alopecia areata.  No bald spots at this time.  She does endorse a significant mount of stress at her job.  TSH and ferritin levels are within normal range.  Have discussed the physiological cycle of hair and other potential contributing factors of a hair loss.  Suspected her stress levels are contributing significantly this.   - Referral to Dermatology    2. Alopecia areata  History of.  No further bald spots at this time.  Would like a second opinion from dermatology.  - Referral to Dermatology    3. Acquired hypothyroidism  TSH stable.  Compliant with medication.  Followed by endocrinology.    4. Hyperprolactinemia (HCC)  Stable on current regimen.  Followed by endocrinology.    5. Iron deficiency  Ferritin level stable.  Continue supplement.    6. Adjustment disorder with mixed anxiety and depressed mood  Chronic problem.  Not well controlled this time.  She does endorse high stress with her job.  She is interested in counseling at this time.  She is also contemplating the idea of possibly having FMLA for this as there are certain days where her stress and anxiety have become physically disabling.  She will follow back up in the clinic with paperwork if she would like this completed.  - Referral to Behavioral Health    7. Stress reaction  Chronic problem.  Not well controlled at this time.  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    8. Need for vaccination  - INFLUENZA VACCINE QUAD INJ (PF)    9. Encounter to establish care        Return in about 6 months (around 2023), or if symptoms worsen or fail to improve, for for FMLA  paperwork.    {I have placed the above orders and discussed them with an approved delegating provider.  The MA is performing the below orders under the direction of Dr. Liz GERMAIN

## 2022-11-17 NOTE — PROGRESS NOTES
Chief Complaint: Follow up for Primary Hypothyroidism secondary to Hashimoto's thyroiditis, idiopathic hyperprolactinemia,    HPI:     Yun Clements is a 41 y.o. female here for follow up of the above medical issue    She has a history of hyperprolactinemia with baseline prolactin levels of greater than 70 diagnosed in [id number] with baseline MRI in 2019 showing no pituitary growth or abnormality.  She was symptomatic with irregular cycles, HAs and bilateral galactorrhea         She is currently on cabergoline 0.5 mg 1 tablet twice a week which has been her medication for over 18 months.     She reports regular menstrual cycles  She denies breast swelling and breast tenderness   She denies nipple discharge.    She denies  blurring of vision.  She reports Has controlled with Tylenol    Recent prolactin is 5.79 on 11/2022  Previous prolactin was 4.61 on April 16, 2022  Previous prolactin was 4.28 on October 8, 2021  Previous prolactin was 5.5 on June 2021          With regards to her primary hypothyroidism she has Hashimoto's thyroiditis with baseline elevated TPO antibodies of 400 back in 2018 or 2019.  Previous TSH levels were greater than 5 at baseline.    She is currently on Synthroid 88 mcg daily.    She prefers brand-name   She feels stressed at work   She denies constipation and cold intolerance  She denies palpitations and tremors  TSH is 1.16 on 11/2022  TSH is normal at 0.63 on April 16, 2022  TSH was 0.58 with a free T4 of 1.3 on October 2021        She also has a history of hair loss that was confirmed to be secondary to iron deficiency  Her iron saturation was low at 13% ferritin is low at 19 on October 2021  She reports better compliance   Her ferritin improved to 55 on 11/2022          Patient's medications, allergies, and social histories were reviewed and updated as appropriate.      ROS:     CONS:     No fever, no chills   EYES:     No diplopia, no blurry vision   CV:           No chest pain,  "no palpitations   PULM:     No SOB, no cough, no hemoptysis.   GI:            No nausea, no vomiting, no diarrhea, no constipation   ENDO:     No polyuria, no polydipsia, no heat intolerance, no cold intolerance       Past Medical History:  Problem List:  2022-04: Iron deficiency  2021-10: Urinary urgency  2021-10: Bilateral hip pain  2021-06: Hashimoto's thyroiditis  2021-06: Alopecia areata  2020-10: Hair loss  2020-10: Painful menstrual periods  2020-09: Acquired hypothyroidism  2020-09: Vitamin D deficiency  2018-11: Hyperprolactinemia (HCC)  2018-09: Abnormal menstrual periods  2018-09: Acute bilateral low back pain without sciatica  2018-09: Breast discharge  2018-09: Chronic tension-type headache, not intractable    Past Surgical History:  Past Surgical History:   Procedure Laterality Date    EYE SURGERY          Allergies:  Patient has no known allergies.     Social History:  Social History     Tobacco Use    Smoking status: Never    Smokeless tobacco: Never   Vaping Use    Vaping Use: Never used   Substance Use Topics    Alcohol use: Yes     Alcohol/week: 0.6 oz     Types: 1 Glasses of wine per week     Comment: occasional - wine    Drug use: No        Family History:   family history includes Diabetes in her maternal grandfather and mother.      PHYSICAL EXAM:   Vital signs: /78 (BP Location: Left arm, Patient Position: Sitting, BP Cuff Size: Adult)   Pulse 87   Ht 1.575 m (5' 2\")   Wt 66.7 kg (147 lb)   SpO2 98%   BMI 26.89 kg/m²   GENERAL: Well-developed, well-nourished in no apparent distress.   EYE:  No ocular asymmetry, PERRLA  HENT: Pink, moist mucous membranes.    NECK: No thyromegaly.   CARDIOVASCULAR:  No murmurs  LUNGS: Clear breath sounds  ABDOMEN: Soft, nontender   EXTREMITIES: No clubbing, cyanosis, or edema.   NEUROLOGICAL: No gross focal motor abnormalities   LYMPH: No cervical adenopathy palpated.   SKIN: oval-shaped circumscribed nonscarred area of scalp behind right ear with no " visible hair growth      Labs:  Lab Results   Component Value Date/Time    SODIUM 135 2022 09:47 AM    POTASSIUM 4.1 2022 09:47 AM    CHLORIDE 101 2022 09:47 AM    CO2 22 2022 09:47 AM    ANION 12.0 2022 09:47 AM    GLUCOSE 86 2022 09:47 AM    BUN 12 2022 09:47 AM    CREATININE 0.75 2022 09:47 AM    CALCIUM 9.4 2022 09:47 AM    ASTSGOT 19 2022 09:47 AM    ALTSGPT 16 2022 09:47 AM    TBILIRUBIN 0.6 2022 09:47 AM    ALBUMIN 4.8 2022 09:47 AM    TOTPROTEIN 7.7 2022 09:47 AM    GLOBULIN 2.9 2022 09:47 AM    AGRATIO 1.7 2022 09:47 AM       Lab Results   Component Value Date/Time    SODIUM 139 2021 0727    POTASSIUM 4.2 2021 0727    CHLORIDE 105 2021 0727    CO2 23 2021 0727    GLUCOSE 95 2021 0727    BUN 11 2021 0727    CREATININE 0.81 2021 0727    CALCIUM 9.0 2021 0727    ANION 11.0 2021 0727       Lab Results   Component Value Date/Time    CHOLSTRLTOT 139 2020 0705    TRIGLYCERIDE 84 2020 0705    HDL 54 2020 0705    LDL 68 2020 0705       Lab Results   Component Value Date/Time    TSHULTRASEN 0.130 (L) 2021 0727     Lab Results   Component Value Date/Time    FREET4 1.51 2021 0727     Lab Results   Component Value Date/Time    FREET3 2.65 2021 0727     No results found for: THYSTIMIG    Lab Results   Component Value Date/Time    MICROSOMALA 54.7 (H) 2020 0626         Imagin2022 8:42 AM     HISTORY/REASON FOR EXAM:  Pituitary adenoma, known or suspected  Hyperprolactinemia     TECHNIQUE/EXAM DESCRIPTION:  Sella protocol MRI with multiplanar reformats and dynamic postcontrast acquisition.     The study was performed on a [employer] Signa 1.5 Yael MRI scanner.     14 mL ProHance contrast was administered intravenously.     COMPARISON:  None.     FINDINGS:  A tiny focus of gradient echo hypointensity is noted in the left  posterior temporal region involving the subcortical zone, stable from the previous examination.  Brainstem and cerebellum are normal.  The IACs, CP angles are normal.     Intracranial flow voids are normal.  A few scattered nonspecific T2 hyperintensities noted in the deep white matter. Dynamic images demonstrate normal pattern of enhancement of the pituitary gland with no evidence for a hypoenhancing or hyperenhancing lesion to suggest a microadenoma.   Pituitary gland is slightly shallow for patient's age but is otherwise within normal limits. The cavernous sinuses are normal in appearance. The pituitary infundibulum, tuber cinereum and hypothalamic regions are within normal limits. Included portions   of the orbits are within normal limits. The portions of paranasal sinuses, mastoid vessels are within normal limits.        IMPRESSION:        Normal MRI of the pituitary gland.     Nonspecific T2 hyperintensities are noted in the periventricular and deep white matter, most likely related to chronic microvascular ischemia. These are stable from the previous study.     Tiny focus of gradient echo hypointensity in the left posterior temporal region may represent a small microcalcification/cavernoma and remains stable.    ASSESSMENT/PLAN:     1. Acquired hypothyroidism  Stable   Continue Synthroid  88 mcg daily  Follow-up in 6 months with repeat TSH    2. Hashimoto's thyroiditis  This is the etiology of her hypothyroidism    3. Hyperprolactinemia (HCC)  Stable   She has idiopathic hyperprolactinemia with negative MRI  Reviewed latest MRI results  Reduce cabergoline to 1/2 of 0.5 mg twice a week  Follow-up in 6 months with repeat prolactin    4. Hair loss  Unstable  Her iron levels are better but she is stressed at work at [employer]  I would recommend continued follow-up with her primary care for this issue    5. Iron deficiency  Improved  Her Ferritin is better at 55      No follow-ups on file.      Thank you kindly for  allowing me to participate in the thyroid care plan for this patient.    Cresencio Horan MD, [facility], [facility]  06/25/21    CC:   Michell Pires P.A.-C.

## 2022-12-20 ENCOUNTER — APPOINTMENT (RX ONLY)
Dept: URBAN - METROPOLITAN AREA CLINIC 6 | Facility: CLINIC | Age: 42
Setting detail: DERMATOLOGY
End: 2022-12-20

## 2022-12-20 DIAGNOSIS — L65.0 TELOGEN EFFLUVIUM: ICD-10-CM

## 2022-12-20 PROCEDURE — ? DIAGNOSIS COMMENT

## 2022-12-20 PROCEDURE — ? RECOMMENDATIONS

## 2022-12-20 PROCEDURE — 99203 OFFICE O/P NEW LOW 30 MIN: CPT

## 2022-12-20 PROCEDURE — ? COUNSELING

## 2022-12-20 ASSESSMENT — LOCATION DETAILED DESCRIPTION DERM
LOCATION DETAILED: LEFT SUPERIOR PARIETAL SCALP
LOCATION DETAILED: RIGHT SUPERIOR PARIETAL SCALP
LOCATION DETAILED: RIGHT MEDIAL FRONTAL SCALP

## 2022-12-20 ASSESSMENT — LOCATION SIMPLE DESCRIPTION DERM
LOCATION SIMPLE: SCALP
LOCATION SIMPLE: RIGHT SCALP

## 2022-12-20 ASSESSMENT — LOCATION ZONE DERM: LOCATION ZONE: SCALP

## 2022-12-20 NOTE — PROCEDURE: MIPS QUALITY
DISPLAY PLAN FREE TEXT
Quality 226: Preventive Care And Screening: Tobacco Use: Screening And Cessation Intervention: Patient screened for tobacco use and is an ex/non-smoker
Detail Level: Detailed
Quality 431: Preventive Care And Screening: Unhealthy Alcohol Use - Screening: Patient not identified as an unhealthy alcohol user when screened for unhealthy alcohol use using a systematic screening method

## 2022-12-20 NOTE — PROCEDURE: RECOMMENDATIONS
Recommendation Preamble: The following recommendations were made during the visit:
Render Risk Assessment In Note?: no
Recommendation Override: Recommended Rogaine shampoo otc & supplements ( no more than 2500)
Detail Level: Zone

## 2022-12-20 NOTE — HPI: HAIR LOSS
Previous Labs: Yes
How Did The Hair Loss Occur?: gradual in onset
How Severe Is Your Hair Loss?: mild
What Hair Products Do You Use?: OTC shampoos
When Were The Labs Drawn? (Drawn...): 1 month ago

## 2023-05-09 ENCOUNTER — HOSPITAL ENCOUNTER (OUTPATIENT)
Dept: LAB | Facility: MEDICAL CENTER | Age: 43
End: 2023-05-09
Attending: INTERNAL MEDICINE
Payer: COMMERCIAL

## 2023-05-09 DIAGNOSIS — E03.9 ACQUIRED HYPOTHYROIDISM: ICD-10-CM

## 2023-05-09 DIAGNOSIS — E22.1 HYPERPROLACTINEMIA (HCC): ICD-10-CM

## 2023-05-09 DIAGNOSIS — E61.1 IRON DEFICIENCY: ICD-10-CM

## 2023-05-09 DIAGNOSIS — E55.9 VITAMIN D DEFICIENCY: ICD-10-CM

## 2023-05-09 DIAGNOSIS — L65.9 HAIR LOSS: ICD-10-CM

## 2023-05-09 LAB
25(OH)D3 SERPL-MCNC: 46 NG/ML (ref 30–100)
ALBUMIN SERPL BCP-MCNC: 4.3 G/DL (ref 3.2–4.9)
ALBUMIN/GLOB SERPL: 1.5 G/DL
ALP SERPL-CCNC: 85 U/L (ref 30–99)
ALT SERPL-CCNC: 16 U/L (ref 2–50)
ANION GAP SERPL CALC-SCNC: 12 MMOL/L (ref 7–16)
AST SERPL-CCNC: 18 U/L (ref 12–45)
BILIRUB SERPL-MCNC: 0.5 MG/DL (ref 0.1–1.5)
BUN SERPL-MCNC: 12 MG/DL (ref 8–22)
CALCIUM ALBUM COR SERPL-MCNC: 9 MG/DL (ref 8.5–10.5)
CALCIUM SERPL-MCNC: 9.2 MG/DL (ref 8.5–10.5)
CHLORIDE SERPL-SCNC: 103 MMOL/L (ref 96–112)
CO2 SERPL-SCNC: 22 MMOL/L (ref 20–33)
CREAT SERPL-MCNC: 0.55 MG/DL (ref 0.5–1.4)
GFR SERPLBLD CREATININE-BSD FMLA CKD-EPI: 118 ML/MIN/1.73 M 2
GLOBULIN SER CALC-MCNC: 2.8 G/DL (ref 1.9–3.5)
GLUCOSE SERPL-MCNC: 91 MG/DL (ref 65–99)
POTASSIUM SERPL-SCNC: 3.8 MMOL/L (ref 3.6–5.5)
PROLACTIN SERPL-MCNC: 8.05 NG/ML (ref 2.8–26)
PROT SERPL-MCNC: 7.1 G/DL (ref 6–8.2)
SODIUM SERPL-SCNC: 137 MMOL/L (ref 135–145)
T4 FREE SERPL-MCNC: 1.28 NG/DL (ref 0.93–1.7)
TSH SERPL DL<=0.005 MIU/L-ACNC: 2.2 UIU/ML (ref 0.38–5.33)

## 2023-05-09 PROCEDURE — 84146 ASSAY OF PROLACTIN: CPT

## 2023-05-09 PROCEDURE — 36415 COLL VENOUS BLD VENIPUNCTURE: CPT

## 2023-05-09 PROCEDURE — 80053 COMPREHEN METABOLIC PANEL: CPT

## 2023-05-09 PROCEDURE — 84443 ASSAY THYROID STIM HORMONE: CPT

## 2023-05-09 PROCEDURE — 84439 ASSAY OF FREE THYROXINE: CPT

## 2023-05-09 PROCEDURE — 82306 VITAMIN D 25 HYDROXY: CPT

## 2023-05-09 PROCEDURE — 84305 ASSAY OF SOMATOMEDIN: CPT

## 2023-05-11 LAB
IGF-I SERPL-MCNC: 178 NG/ML (ref 75–267)
IGF-I Z-SCORE SERPL: 0.6

## 2023-05-25 ENCOUNTER — OFFICE VISIT (OUTPATIENT)
Dept: ENDOCRINOLOGY | Facility: MEDICAL CENTER | Age: 43
End: 2023-05-25
Attending: INTERNAL MEDICINE
Payer: COMMERCIAL

## 2023-05-25 VITALS
DIASTOLIC BLOOD PRESSURE: 60 MMHG | BODY MASS INDEX: 26.76 KG/M2 | OXYGEN SATURATION: 96 % | HEIGHT: 62 IN | SYSTOLIC BLOOD PRESSURE: 102 MMHG | WEIGHT: 145.4 LBS | HEART RATE: 80 BPM

## 2023-05-25 DIAGNOSIS — E06.3 HASHIMOTO'S THYROIDITIS: ICD-10-CM

## 2023-05-25 DIAGNOSIS — E22.1 HYPERPROLACTINEMIA (HCC): ICD-10-CM

## 2023-05-25 DIAGNOSIS — E03.9 ACQUIRED HYPOTHYROIDISM: ICD-10-CM

## 2023-05-25 DIAGNOSIS — L65.9 HAIR LOSS: ICD-10-CM

## 2023-05-25 PROCEDURE — 3078F DIAST BP <80 MM HG: CPT | Performed by: INTERNAL MEDICINE

## 2023-05-25 PROCEDURE — 99211 OFF/OP EST MAY X REQ PHY/QHP: CPT | Performed by: INTERNAL MEDICINE

## 2023-05-25 PROCEDURE — 3074F SYST BP LT 130 MM HG: CPT | Performed by: INTERNAL MEDICINE

## 2023-05-25 PROCEDURE — 99214 OFFICE O/P EST MOD 30 MIN: CPT | Performed by: INTERNAL MEDICINE

## 2023-05-25 RX ORDER — LEVOTHYROXINE SODIUM 88 MCG
88 TABLET ORAL
Qty: 90 TABLET | Refills: 3 | Status: SHIPPED | OUTPATIENT
Start: 2023-05-25 | End: 2023-08-28 | Stop reason: SDUPTHER

## 2023-05-25 RX ORDER — CABERGOLINE 0.5 MG/1
0.25 TABLET ORAL
Qty: 24 TABLET | Refills: 3 | Status: SHIPPED | OUTPATIENT
Start: 2023-05-25 | End: 2023-10-22

## 2023-05-25 ASSESSMENT — PATIENT HEALTH QUESTIONNAIRE - PHQ9: CLINICAL INTERPRETATION OF PHQ2 SCORE: 0

## 2023-05-25 ASSESSMENT — FIBROSIS 4 INDEX: FIB4 SCORE: 0.47

## 2023-05-25 NOTE — PROGRESS NOTES
Chief Complaint: Follow up for Primary Hypothyroidism secondary to Hashimoto's thyroiditis, idiopathic hyperprolactinemia,    HPI:     Yun Clements is a 42 y.o. female here for follow up of the above medical issue    She has a history of hyperprolactinemia with baseline prolactin levels of greater than 70 diagnosed in [id number] with baseline MRI in 2019 showing no pituitary growth or abnormality.  She was symptomatic with irregular cycles, HAs and bilateral galactorrhea         She is currently on cabergoline 0.5 mg 1/2 tablet twice a week which has been her medication for over 6 months.    She reports regular menstrual cycles  She denies breast swelling and breast tenderness   She denies nipple discharge.    She denies  blurring of vision.  She reports HAs controlled with Tylenol    Recent prolactin is 8.0 on 5/2023  Previous prolactin is 5.79 on 11/2022  Previous prolactin was 4.61 on April 16, 2022  Previous prolactin was 4.28 on October 8, 2021  Previous prolactin was 5.5 on June 2021          With regards to her primary hypothyroidism she has Hashimoto's thyroiditis with baseline elevated TPO antibodies of 400 back in 2018 or 2019.  Previous TSH levels were greater than 5 at baseline.    She is currently on Synthroid 88 mcg daily.    She prefers brand-name   She reports that she changed jobs  She no longer hair loss  She denies constipation and cold intolerance  She denies palpitations and tremors  TSH is 2.2 on 5/2023  TSH is 1.16 on 11/2022  TSH is normal at 0.63 on April 16, 2022  TSH was 0.58 with a free T4 of 1.3 on October 2021        She also has a history of hair loss that was confirmed to be secondary to iron deficiency  Her iron saturation was low at 13% ferritin is low at 19 on October 2021  She reports better compliance   Her ferritin improved to 55 on 11/2022          Patient's medications, allergies, and social histories were reviewed and updated as appropriate.      ROS:     CONS:     No  "fever, no chills   EYES:     No diplopia, no blurry vision   CV:           No chest pain, no palpitations   PULM:     No SOB, no cough, no hemoptysis.   GI:            No nausea, no vomiting, no diarrhea, no constipation   ENDO:     No polyuria, no polydipsia, no heat intolerance, no cold intolerance       Past Medical History:  Problem List:  2022-04: Iron deficiency  2021-10: Urinary urgency  2021-10: Bilateral hip pain  2021-06: Hashimoto's thyroiditis  2021-06: Alopecia areata  2020-10: Hair loss  2020-10: Painful menstrual periods  2020-09: Acquired hypothyroidism  2020-09: Vitamin D deficiency  2018-11: Hyperprolactinemia (HCC)  2018-09: Abnormal menstrual periods  2018-09: Acute bilateral low back pain without sciatica  2018-09: Breast discharge  2018-09: Chronic tension-type headache, not intractable      Past Surgical History:  Past Surgical History:   Procedure Laterality Date    EYE SURGERY          Allergies:  Patient has no known allergies.     Social History:  Social History     Tobacco Use    Smoking status: Never    Smokeless tobacco: Never   Vaping Use    Vaping Use: Never used   Substance Use Topics    Alcohol use: Yes     Alcohol/week: 0.6 oz     Types: 1 Glasses of wine per week     Comment: occasional - wine    Drug use: No        Family History:   family history includes Diabetes in her maternal grandfather and mother.      PHYSICAL EXAM:   Vital signs: /60 (BP Location: Left arm, Patient Position: Sitting)   Pulse 80   Ht 1.575 m (5' 2\")   Wt 66 kg (145 lb 6.4 oz)   SpO2 96%   BMI 26.59 kg/m²   GENERAL: Well-developed, well-nourished in no apparent distress.   EYE:  No ocular asymmetry, PERRLA  HENT: Pink, moist mucous membranes.    NECK: No thyromegaly.   CARDIOVASCULAR:  No murmurs  LUNGS: Clear breath sounds  ABDOMEN: Soft, nontender   EXTREMITIES: No clubbing, cyanosis, or edema.   NEUROLOGICAL: No gross focal motor abnormalities   LYMPH: No cervical adenopathy palpated.   SKIN: " oval-shaped circumscribed nonscarred area of scalp behind right ear with no visible hair growth      Labs:  Lab Results   Component Value Date/Time    SODIUM 137 2023 11:53 AM    POTASSIUM 3.8 2023 11:53 AM    CHLORIDE 103 2023 11:53 AM    CO2 22 2023 11:53 AM    ANION 12.0 2023 11:53 AM    GLUCOSE 91 2023 11:53 AM    BUN 12 2023 11:53 AM    CREATININE 0.55 2023 11:53 AM    CALCIUM 9.2 2023 11:53 AM    ASTSGOT 18 2023 11:53 AM    ALTSGPT 16 2023 11:53 AM    TBILIRUBIN 0.5 2023 11:53 AM    ALBUMIN 4.3 2023 11:53 AM    TOTPROTEIN 7.1 2023 11:53 AM    GLOBULIN 2.8 2023 11:53 AM    AGRATIO 1.5 2023 11:53 AM       Lab Results   Component Value Date/Time    SODIUM 139 2021 0727    POTASSIUM 4.2 2021 0727    CHLORIDE 105 2021 0727    CO2 23 2021 0727    GLUCOSE 95 2021 0727    BUN 11 2021 0727    CREATININE 0.81 2021 0727    CALCIUM 9.0 2021 0727    ANION 11.0 2021 0727       Lab Results   Component Value Date/Time    CHOLSTRLTOT 139 2020 0705    TRIGLYCERIDE 84 2020 0705    HDL 54 2020 0705    LDL 68 2020 0705       Lab Results   Component Value Date/Time    TSHULTRASEN 0.130 (L) 2021 0727     Lab Results   Component Value Date/Time    FREET4 1.51 2021 0727     Lab Results   Component Value Date/Time    FREET3 2.65 2021 0727     No results found for: THYSTIMIG    Lab Results   Component Value Date/Time    MICROSOMALA 54.7 (H) 2020 06         Imagin2022 8:42 AM     HISTORY/REASON FOR EXAM:  Pituitary adenoma, known or suspected  Hyperprolactinemia     TECHNIQUE/EXAM DESCRIPTION:  Sella protocol MRI with multiplanar reformats and dynamic postcontrast acquisition.     The study was performed on a [employer] Signa 1.5 Yael MRI scanner.     14 mL ProHance contrast was administered intravenously.     COMPARISON:  None.      FINDINGS:  A tiny focus of gradient echo hypointensity is noted in the left posterior temporal region involving the subcortical zone, stable from the previous examination.  Brainstem and cerebellum are normal.  The IACs, CP angles are normal.     Intracranial flow voids are normal.  A few scattered nonspecific T2 hyperintensities noted in the deep white matter. Dynamic images demonstrate normal pattern of enhancement of the pituitary gland with no evidence for a hypoenhancing or hyperenhancing lesion to suggest a microadenoma.   Pituitary gland is slightly shallow for patient's age but is otherwise within normal limits. The cavernous sinuses are normal in appearance. The pituitary infundibulum, tuber cinereum and hypothalamic regions are within normal limits. Included portions   of the orbits are within normal limits. The portions of paranasal sinuses, mastoid vessels are within normal limits.        IMPRESSION:        Normal MRI of the pituitary gland.     Nonspecific T2 hyperintensities are noted in the periventricular and deep white matter, most likely related to chronic microvascular ischemia. These are stable from the previous study.     Tiny focus of gradient echo hypointensity in the left posterior temporal region may represent a small microcalcification/cavernoma and remains stable.    ASSESSMENT/PLAN:     1. Acquired hypothyroidism  Stable   Continue Synthroid  88 mcg daily  Follow-up in 6 months with repeat TSH    2. Hashimoto's thyroiditis  This is the etiology of her hypothyroidism    3. Hyperprolactinemia (HCC)  Stable   She has idiopathic hyperprolactinemia with negative MRI  Continue  cabergoline to 1/2 of 0.5 mg twice a week  Follow-up in 6 months with repeat prolactin    4. Hair loss  This is resolved continue minoxidil         Return in about 6 months (around 11/25/2023).      Thank you kindly for allowing me to participate in the thyroid care plan for this patient.    Cresencio Horan MD, FACE,  [facility]      CC:   Michell Pires P.A.-C.

## 2023-06-27 ENCOUNTER — OFFICE VISIT (OUTPATIENT)
Dept: MEDICAL GROUP | Facility: MEDICAL CENTER | Age: 43
End: 2023-06-27
Payer: COMMERCIAL

## 2023-06-27 ENCOUNTER — HOSPITAL ENCOUNTER (OUTPATIENT)
Facility: MEDICAL CENTER | Age: 43
End: 2023-06-27
Attending: NURSE PRACTITIONER
Payer: COMMERCIAL

## 2023-06-27 VITALS
HEART RATE: 85 BPM | OXYGEN SATURATION: 97 % | SYSTOLIC BLOOD PRESSURE: 126 MMHG | DIASTOLIC BLOOD PRESSURE: 80 MMHG | HEIGHT: 62 IN | BODY MASS INDEX: 26.87 KG/M2 | TEMPERATURE: 97.3 F | WEIGHT: 146 LBS

## 2023-06-27 DIAGNOSIS — Z12.4 SCREENING FOR MALIGNANT NEOPLASM OF CERVIX: ICD-10-CM

## 2023-06-27 DIAGNOSIS — Z11.51 SCREENING FOR HPV (HUMAN PAPILLOMAVIRUS): ICD-10-CM

## 2023-06-27 DIAGNOSIS — Z12.31 ENCOUNTER FOR SCREENING MAMMOGRAM FOR BREAST CANCER: ICD-10-CM

## 2023-06-27 DIAGNOSIS — Z11.8 SCREENING FOR CHLAMYDIAL DISEASE: ICD-10-CM

## 2023-06-27 DIAGNOSIS — N94.6 DYSMENORRHEA: ICD-10-CM

## 2023-06-27 DIAGNOSIS — L98.9 SKIN LESION: ICD-10-CM

## 2023-06-27 DIAGNOSIS — Z01.419 WELL WOMAN EXAM WITH ROUTINE GYNECOLOGICAL EXAM: ICD-10-CM

## 2023-06-27 PROCEDURE — 87591 N.GONORRHOEAE DNA AMP PROB: CPT

## 2023-06-27 PROCEDURE — 88175 CYTOPATH C/V AUTO FLUID REDO: CPT

## 2023-06-27 PROCEDURE — 99396 PREV VISIT EST AGE 40-64: CPT | Performed by: NURSE PRACTITIONER

## 2023-06-27 PROCEDURE — 87491 CHLMYD TRACH DNA AMP PROBE: CPT

## 2023-06-27 PROCEDURE — 3074F SYST BP LT 130 MM HG: CPT | Performed by: NURSE PRACTITIONER

## 2023-06-27 PROCEDURE — 3079F DIAST BP 80-89 MM HG: CPT | Performed by: NURSE PRACTITIONER

## 2023-06-27 PROCEDURE — 87624 HPV HI-RISK TYP POOLED RSLT: CPT

## 2023-06-27 RX ORDER — CLINDAMYCIN PHOSPHATE 10 MG/G
1 GEL TOPICAL 2 TIMES DAILY
Qty: 30 G | Refills: 0 | Status: SHIPPED | OUTPATIENT
Start: 2023-06-27 | End: 2023-07-04

## 2023-06-27 ASSESSMENT — FIBROSIS 4 INDEX: FIB4 SCORE: 0.47

## 2023-06-27 NOTE — PROGRESS NOTES
Subjective:     CC:   Chief Complaint   Patient presents with    Gynecologic Exam       HPI:   Yun Clements is a 42 y.o. female who presents for Routine Preventative Exam    Last Pap Smear: 10/2021  H/O Abnormal Pap: Yes,    Last Mammogram: due  Last Bone Density Test: n/a  Last Colorectal Cancer Screening: n/a  Last Tdap: 2019  Received HPV series: No    Noticed blood clots-during menstrual cycle  More on R side  Ongoing for two years.   Advil can be helpful     Up to date on Eye Exam:  Yes  Up to date on Dental Cleanings:  Yes    Exercise: not regular exercise routine.   Diet: regular      Patient's last menstrual period was 06/15/2023 (exact date).  Hx STDs: Yes, HPV  Birth control: condoms  No significant bloating/fluid retention, pelvic pain, or dyspareunia. No abnormal vaginal discharge.  No breast tenderness, mass, nipple discharge, changes in size or contour, or abnormal cyclic discomfort.    OB History    Para Term  AB Living   2 2 0 0 0 0   SAB IAB Ectopic Molar Multiple Live Births   0 0 0 0 0 0      She  has no history on file for sexual activity.    She  has a past medical history of Thyroid disease.  She  has a past surgical history that includes eye surgery.    Family History   Problem Relation Age of Onset    Diabetes Mother     Diabetes Maternal Grandfather     Cancer Neg Hx     Heart Attack Neg Hx     Heart Disease Neg Hx     Stroke Neg Hx      Social History     Tobacco Use    Smoking status: Never    Smokeless tobacco: Never   Vaping Use    Vaping Use: Never used   Substance Use Topics    Alcohol use: Yes     Alcohol/week: 0.6 oz     Types: 1 Glasses of wine per week     Comment: occasional - wine    Drug use: No       Patient Active Problem List    Diagnosis Date Noted    Iron deficiency 2022    Urinary urgency 10/20/2021    Bilateral hip pain 10/20/2021    Hashimoto's thyroiditis 2021    Alopecia areata 2021    Hair loss 10/14/2020    Painful  "menstrual periods 10/14/2020    Acquired hypothyroidism 09/21/2020    Vitamin D deficiency 09/21/2020    Hyperprolactinemia (HCC) 11/27/2018    Chronic tension-type headache, not intractable 09/05/2018     Current Outpatient Medications   Medication Sig Dispense Refill    clindamycin (CLEOCIN T) 1 % Gel Apply 1 g topically 2 times a day for 7 days. 30 g 0    SYNTHROID 88 MCG Tab Take 1 Tablet by mouth every morning on an empty stomach. 90 Tablet 3    cabergoline (DOSTINEX) 0.5 MG tablet Take 0.5 Tablets by mouth every 72 hours. One half a tab two times a week.  Indications: Disorder with Elevated Levels of Prolactin in the Blood 24 Tablet 3    vitamin D2, Ergocalciferol, (DRISDOL) 1.25 MG (75807 UT) Cap capsule Take 1 Capsule by mouth every 7 days. With food. 12 Capsule 3    ibuprofen (MOTRIN) 800 MG Tab Take 1 Tab by mouth every 8 hours as needed. 20 Tab 3     No current facility-administered medications for this visit.     No Known Allergies    Review of Systems : Reports dysmenorrhea, primarily localized in the right lower quadrant.  Constitutional: Negative for fever, chills and malaise/fatigue.   HENT: Negative for congestion.    Eyes: Negative for pain.   Respiratory: Negative for cough and shortness of breath.    Cardiovascular: Negative for chest pain and leg swelling.   Gastrointestinal: Negative for nausea, vomiting, abdominal pain and diarrhea.   Genitourinary: Negative for dysuria and hematuria.   Skin: Negative for rash.   Neurological: Negative for dizziness, focal weakness and headaches.   Endo/Heme/Allergies: Does not bruise/bleed easily.   Psychiatric/Behavioral: Negative for depression.  The patient is not nervous/anxious.      Objective:   /80 (BP Location: Right arm, Patient Position: Sitting, BP Cuff Size: Adult)   Pulse 85   Temp 36.3 °C (97.3 °F) (Temporal)   Ht 1.575 m (5' 2\")   Wt 66.2 kg (146 lb)   LMP 06/15/2023 (Exact Date)   SpO2 97%   BMI 26.70 kg/m²     Wt Readings from " Last 4 Encounters:   06/27/23 66.2 kg (146 lb)   05/25/23 66 kg (145 lb 6.4 oz)   11/16/22 66.7 kg (147 lb)   11/16/22 65.8 kg (145 lb)       A chaperone was offered to the patient during today's exam. Patient declined chaperone.    Physical Exam:  Constitutional: Well-developed and well-nourished. Not diaphoretic. No distress.   Skin: Skin is warm and dry. No rash noted.  Head: Atraumatic without lesions.  Eyes: Conjunctivae and extraocular motions are normal. Pupils are equal, round, and reactive to light. No scleral icterus.   Ears:  External ears unremarkable.   Nose: Nares patent. Septum midline. Turbinates without erythema nor edema. No discharge.   Mouth/Throat: Tongue normal. Oropharynx is clear and moist. Posterior pharynx without erythema or exudates.  Neck: Supple, trachea midline. Normal range of motion. No thyromegaly present. No lymphadenopathy--cervical or supraclavicular.  Cardiovascular: Regular rate and rhythm, S1 and S2 without murmur, rubs, or gallops.    Respiratory: Effort normal. Clear to auscultation throughout. No adventitious sounds.   Breast exam deferred  Abdomen: Soft, non tender, and without distention. Active bowel sounds in all four quadrants. No rebound, guarding, masses or HSM.  : Perineum and external genitalia normal without rash. Vagina with normal and physiologic and scant discharge. Cervix without visible lesions or discharge. Bimanual exam without adnexal masses or cervical motion tenderness.  Right labia with dried, healed papular lesion.  Extremities: No cyanosis, clubbing, erythema, nor edema. Distal pulses intact and symmetric.   Musculoskeletal: All major joints AROM full in all directions without pain.  Neurological: Alert and oriented x 3. Grossly non-focal.   Psychiatric:  Behavior, mood, and affect are appropriate.    Assessment and Plan:     1. Well woman exam with routine gynecological exam  - THINPREP PAP W/HPV AND CTNG; Future    2. Skin lesion  Acute, resolved  on today's visit.  She reports that this is recurred several times.  Recommend Epsom salt soaks, warm compress.  Trial of clindamycin topical gel for 1 week.  If not return back for evaluation and possible culture.  - clindamycin (CLEOCIN T) 1 % Gel; Apply 1 g topically 2 times a day for 7 days.  Dispense: 30 g; Refill: 0    3. Screening for malignant neoplasm of cervix  - THINPREP PAP W/HPV AND CTNG; Future    4. Screening for HPV (human papillomavirus)  - THINPREP PAP W/HPV AND CTNG; Future    5. Screening for chlamydial disease  - THINPREP PAP W/HPV AND CTNG; Future    6. Dysmenorrhea  Chronic more than 2 years.  Last ultrasound in 2021 which was normal.  Recommend taking naproxen twice daily during menstrual cycle, using heat.  She would like to move forward with updating a vaginal ultrasound to ensure there is no structural abnormalities such as ovarian cysts or fibroids.  - US-PELVIC COMPLETE (TRANSABDOMINAL/TRANSVAGINAL) (COMBO); Future    7. Encounter for screening mammogram for breast cancer  - MA-SCREENING MAMMO BILAT W/TOMOSYNTHESIS W/CAD; Future        Health maintenance: Due for mammogram  Labs per orders-  Immunizations per orders-  Patient counseled about skin care, diet, supplements, and exercise.  Discussed  breast self exam, mammography screening, adequate intake of calcium and vitamin D, diet and exercise     Smoking: continue complete avoidance of all tobacco products     Physical Activity: goal is 30 min of moderate activity 5 times a week     Weight Management and Nutrition: high vegetable, high protein diet like the Mediterranean diet, portion control, avoid excessive sugars.      Follow-up: Return for Annual Preventative Exam.    Marilia VILLANUEVA

## 2023-06-28 DIAGNOSIS — Z11.8 SCREENING FOR CHLAMYDIAL DISEASE: ICD-10-CM

## 2023-06-28 DIAGNOSIS — Z01.419 WELL WOMAN EXAM WITH ROUTINE GYNECOLOGICAL EXAM: ICD-10-CM

## 2023-06-28 DIAGNOSIS — Z11.51 SCREENING FOR HPV (HUMAN PAPILLOMAVIRUS): ICD-10-CM

## 2023-06-28 DIAGNOSIS — Z12.4 SCREENING FOR MALIGNANT NEOPLASM OF CERVIX: ICD-10-CM

## 2023-06-28 LAB
C TRACH DNA GENITAL QL NAA+PROBE: NEGATIVE
CYTOLOGY REG CYTOL: NORMAL
HPV HR 12 DNA CVX QL NAA+PROBE: NEGATIVE
HPV16 DNA SPEC QL NAA+PROBE: NEGATIVE
HPV18 DNA SPEC QL NAA+PROBE: NEGATIVE
N GONORRHOEA DNA GENITAL QL NAA+PROBE: NEGATIVE
SPECIMEN SOURCE: NORMAL
SPECIMEN SOURCE: NORMAL

## 2023-07-05 ENCOUNTER — HOSPITAL ENCOUNTER (OUTPATIENT)
Dept: RADIOLOGY | Facility: MEDICAL CENTER | Age: 43
End: 2023-07-05
Attending: NURSE PRACTITIONER
Payer: COMMERCIAL

## 2023-07-05 DIAGNOSIS — Z12.31 ENCOUNTER FOR SCREENING MAMMOGRAM FOR BREAST CANCER: ICD-10-CM

## 2023-07-05 PROCEDURE — 77063 BREAST TOMOSYNTHESIS BI: CPT

## 2023-07-23 ENCOUNTER — APPOINTMENT (OUTPATIENT)
Dept: RADIOLOGY | Facility: MEDICAL CENTER | Age: 43
End: 2023-07-23
Attending: NURSE PRACTITIONER
Payer: COMMERCIAL

## 2023-08-04 ENCOUNTER — APPOINTMENT (OUTPATIENT)
Dept: RADIOLOGY | Facility: MEDICAL CENTER | Age: 43
End: 2023-08-04
Attending: NURSE PRACTITIONER
Payer: COMMERCIAL

## 2023-08-23 ENCOUNTER — TELEPHONE (OUTPATIENT)
Dept: ENDOCRINOLOGY | Facility: MEDICAL CENTER | Age: 43
End: 2023-08-23
Payer: COMMERCIAL

## 2023-08-23 NOTE — TELEPHONE ENCOUNTER
"Patient was wondering if we can add to her synthroid prescription \"dispense as written 1 and 2\".    She also need a new prescription/refill for her vitamin D. Thank you.  "

## 2023-08-28 ENCOUNTER — HOSPITAL ENCOUNTER (OUTPATIENT)
Dept: RADIOLOGY | Facility: MEDICAL CENTER | Age: 43
End: 2023-08-28
Attending: NURSE PRACTITIONER
Payer: COMMERCIAL

## 2023-08-28 DIAGNOSIS — E03.9 ACQUIRED HYPOTHYROIDISM: ICD-10-CM

## 2023-08-28 DIAGNOSIS — N94.6 DYSMENORRHEA: ICD-10-CM

## 2023-08-28 PROCEDURE — 76830 TRANSVAGINAL US NON-OB: CPT

## 2023-08-28 RX ORDER — LEVOTHYROXINE SODIUM 88 MCG
88 TABLET ORAL
Qty: 90 TABLET | Refills: 3 | Status: SHIPPED | OUTPATIENT
Start: 2023-08-28 | End: 2023-12-04

## 2023-08-30 DIAGNOSIS — N80.03 ADENOMYOSIS: ICD-10-CM

## 2023-08-30 DIAGNOSIS — N94.6 DYSMENORRHEA: ICD-10-CM

## 2023-08-30 DIAGNOSIS — D25.9 UTERINE LEIOMYOMA, UNSPECIFIED LOCATION: ICD-10-CM

## 2023-09-02 DIAGNOSIS — E55.9 VITAMIN D DEFICIENCY: ICD-10-CM

## 2023-09-03 RX ORDER — ERGOCALCIFEROL 1.25 MG/1
50000 CAPSULE ORAL
Qty: 12 CAPSULE | Refills: 3 | Status: SHIPPED | OUTPATIENT
Start: 2023-09-03

## 2023-10-21 DIAGNOSIS — E22.1 HYPERPROLACTINEMIA (HCC): ICD-10-CM

## 2023-10-22 RX ORDER — CABERGOLINE 0.5 MG/1
0.25 TABLET ORAL
Qty: 72 TABLET | Refills: 2 | Status: SHIPPED | OUTPATIENT
Start: 2023-10-22

## 2023-10-23 ENCOUNTER — GYNECOLOGY VISIT (OUTPATIENT)
Dept: OBGYN | Facility: CLINIC | Age: 43
End: 2023-10-23
Payer: COMMERCIAL

## 2023-10-23 VITALS
BODY MASS INDEX: 26.87 KG/M2 | HEIGHT: 62 IN | DIASTOLIC BLOOD PRESSURE: 86 MMHG | WEIGHT: 146 LBS | SYSTOLIC BLOOD PRESSURE: 123 MMHG

## 2023-10-23 DIAGNOSIS — B96.89 BACTERIAL VAGINOSIS: ICD-10-CM

## 2023-10-23 DIAGNOSIS — N94.6 DYSMENORRHEA: ICD-10-CM

## 2023-10-23 DIAGNOSIS — N76.0 BACTERIAL VAGINOSIS: ICD-10-CM

## 2023-10-23 DIAGNOSIS — R87.610 ASCUS OF CERVIX WITH NEGATIVE HIGH RISK HPV: ICD-10-CM

## 2023-10-23 DIAGNOSIS — Z30.09 CONSULTATION FOR FEMALE STERILIZATION: ICD-10-CM

## 2023-10-23 PROCEDURE — 3074F SYST BP LT 130 MM HG: CPT | Performed by: OBSTETRICS & GYNECOLOGY

## 2023-10-23 PROCEDURE — 3079F DIAST BP 80-89 MM HG: CPT | Performed by: OBSTETRICS & GYNECOLOGY

## 2023-10-23 PROCEDURE — 99203 OFFICE O/P NEW LOW 30 MIN: CPT | Performed by: OBSTETRICS & GYNECOLOGY

## 2023-10-23 RX ORDER — METRONIDAZOLE 7.5 MG/G
1 GEL VAGINAL
Qty: 70 G | Refills: 0 | Status: SHIPPED | OUTPATIENT
Start: 2023-10-23 | End: 2023-12-04

## 2023-10-23 RX ORDER — ACETAMINOPHEN AND CODEINE PHOSPHATE 120; 12 MG/5ML; MG/5ML
1 SOLUTION ORAL DAILY
Qty: 84 TABLET | Refills: 3 | Status: SHIPPED | OUTPATIENT
Start: 2023-10-23

## 2023-10-23 NOTE — PROGRESS NOTES
GYN Consult    CC/reason for consult: dysmenorrhea    HPI: Yun Clemenst is a 42 y.o.  with painful menses for the last 2 years. Mostly on the right side. Pain is days before her menses and during menses. Sister has endometriosis. Pain radiates down her leg.     Does not want any more children.     Occasional increased vaginal discharge. Not currently having symptoms. When she does, it is watery and it smells.       ROS:  Constitutional: No fever, weight loss, weight gain, or fatigue  Skin/breast: No breast lump, nipple discharge, acne  Cardiovascular: No chest pain, leg swelling, palpitations  Respiratory: No shortness of breath, wheezing, or cough  Genitourinary: No pelvic pain, vaginal discharge, painful urination, abnormal periods, involuntary loss of urine, or vaginal bulge.  GI: No diarrhea, constipation, blood in stool, vomiting, abdominal pain  Endocrine: No hot flashes, abnormal thirst  Psychiatric: No depression, anxiety, or thoughts of self-harm  Neurologic: No headaches or seizures  Heme/lymph: No enlarged lymph nodes, denies bruising/bleeding that will not stop  Allergic: Denies allergies  MSK: Denies muscle weakness        GYN History:  Menarche @16.  Menses regular, lasting 3-4 days, not particularly heavy.  ASCUS HPV negative . She had an abnormal one 10 years ago and she had a colpo and everything normal since then. Chlamydia many years ago       OB history:  OB History    Para Term  AB Living   2 2           SAB IAB Ectopic Molar Multiple Live Births                    # Outcome Date GA Lbr Vincenzo/2nd Weight Sex Delivery Anes PTL Lv   2 Para            1 Para                Past Medical History:   Diagnosis Date    Thyroid disease        Past Surgical History:   Procedure Laterality Date    EYE SURGERY         Medications:   Current Outpatient Medications Ordered in Epic   Medication Sig Dispense Refill    norethindrone (MICRONOR) 0.35 MG tablet Take 1 Tablet by mouth  "every day. 84 Tablet 3    metroNIDAZOLE (METROGEL-VAGINAL) 0.75 % Gel Insert 1 Applicator into the vagina at bedtime. 70 g 0    cabergoline (DOSTINEX) 0.5 MG tablet TAKE 0.5 TABLETS BY MOUTH EVERY 72 HOURS. ONE HALF A TAB TWO TIMES A WEEK. INDICATIONS: DISORDER WITH ELEVATED LEVELS OF PROLACTIN IN THE BLOOD 72 Tablet 2    vitamin D2, Ergocalciferol, (DRISDOL) 1.25 MG (71612 UT) Cap capsule Take 1 Capsule by mouth every 7 days. With food. 12 Capsule 3    SYNTHROID 88 MCG Tab Take 1 Tablet by mouth every morning on an empty stomach. 90 Tablet 3    ibuprofen (MOTRIN) 800 MG Tab Take 1 Tab by mouth every 8 hours as needed. 20 Tab 3     No current Epic-ordered facility-administered medications on file.       Allergies: Patient has no known allergies.    Social History     Socioeconomic History    Marital status: Single   Tobacco Use    Smoking status: Never    Smokeless tobacco: Never   Vaping Use    Vaping Use: Never used   Substance and Sexual Activity    Alcohol use: Yes     Alcohol/week: 0.6 oz     Types: 1 Glasses of wine per week     Comment: occasional - wine    Drug use: No       Family History   Problem Relation Age of Onset    Diabetes Mother     Diabetes Maternal Grandfather     Cancer Neg Hx     Heart Attack Neg Hx     Heart Disease Neg Hx     Stroke Neg Hx      Denies hx of GI/GYN/breast cancers    Physical Exam:  /86 (BP Location: Right arm, Patient Position: Sitting, BP Cuff Size: Adult)   Ht 5' 2\"   Wt 146 lb   BMI 26.70 kg/m²   gen: AAO, NAD, affect appropriate  Neck: non-tender, no masses, no thyromegaly/nodules appreciated  CV: RRR; no LE edema  Resp: Symmetric non labored breathing, CTAB  Skin: warm/dry, no lesions    A/P: 42 y.o.  with   1. ASCUS of cervix with negative high risk HPV        2. Dysmenorrhea        3. Bacterial vaginosis  metroNIDAZOLE (METROGEL-VAGINAL) 0.75 % Gel      4. Consultation for female sterilization            Metrogel to trial when she has increased discharge. "   Ascus- repeat pap next year. Discussed neg HPV status as reassuring.   Patient is sure that she never wants to have any more children and is interested in sterilization.  She is also interested to see if she has endometriosis as she has this pain on her right side and her sister has endometriosis.  We discussed the potential for a laparoscopic bilateral salpingectomy with possible fulguration of endometriosis.  We discussed risks of this procedure including bleeding, infection, and injury to surrounding organs like bowel, bladder, and ureters.  Patient expresses understanding and she is going to think about it.  In the meantime we will start a progesterone only pill to see if this helps mediate pain.  Follow-up 3 months to see how she is doing.

## 2023-10-23 NOTE — PROGRESS NOTES
Patient here for a GYN follow up.   pharmacy verified.  Patient phone #[phone] (home)   PAP- 6/28/23   LMP- 10/5/23  LIZ- per pt got it done 2 months ago.     Pt got an US done 8/28/23 - pelvic ultrasound

## 2023-11-29 ENCOUNTER — HOSPITAL ENCOUNTER (OUTPATIENT)
Dept: LAB | Facility: MEDICAL CENTER | Age: 43
End: 2023-11-29
Attending: INTERNAL MEDICINE
Payer: COMMERCIAL

## 2023-11-29 DIAGNOSIS — L65.9 HAIR LOSS: ICD-10-CM

## 2023-11-29 DIAGNOSIS — E22.1 HYPERPROLACTINEMIA (HCC): ICD-10-CM

## 2023-11-29 DIAGNOSIS — E03.9 ACQUIRED HYPOTHYROIDISM: ICD-10-CM

## 2023-11-29 DIAGNOSIS — E06.3 HASHIMOTO'S THYROIDITIS: ICD-10-CM

## 2023-11-29 PROCEDURE — 84146 ASSAY OF PROLACTIN: CPT

## 2023-11-29 PROCEDURE — 82306 VITAMIN D 25 HYDROXY: CPT

## 2023-11-29 PROCEDURE — 84443 ASSAY THYROID STIM HORMONE: CPT

## 2023-11-29 PROCEDURE — 84439 ASSAY OF FREE THYROXINE: CPT

## 2023-11-29 PROCEDURE — 80053 COMPREHEN METABOLIC PANEL: CPT

## 2023-11-29 PROCEDURE — 36415 COLL VENOUS BLD VENIPUNCTURE: CPT

## 2023-11-30 LAB
25(OH)D3 SERPL-MCNC: 47 NG/ML (ref 30–100)
ALBUMIN SERPL BCP-MCNC: 4.2 G/DL (ref 3.2–4.9)
ALBUMIN/GLOB SERPL: 1.4 G/DL
ALP SERPL-CCNC: 80 U/L (ref 30–99)
ALT SERPL-CCNC: 21 U/L (ref 2–50)
ANION GAP SERPL CALC-SCNC: 10 MMOL/L (ref 7–16)
AST SERPL-CCNC: 19 U/L (ref 12–45)
BILIRUB SERPL-MCNC: 0.6 MG/DL (ref 0.1–1.5)
BUN SERPL-MCNC: 14 MG/DL (ref 8–22)
CALCIUM ALBUM COR SERPL-MCNC: 8.9 MG/DL (ref 8.5–10.5)
CALCIUM SERPL-MCNC: 9.1 MG/DL (ref 8.5–10.5)
CHLORIDE SERPL-SCNC: 107 MMOL/L (ref 96–112)
CO2 SERPL-SCNC: 23 MMOL/L (ref 20–33)
CREAT SERPL-MCNC: 0.79 MG/DL (ref 0.5–1.4)
GFR SERPLBLD CREATININE-BSD FMLA CKD-EPI: 96 ML/MIN/1.73 M 2
GLOBULIN SER CALC-MCNC: 3 G/DL (ref 1.9–3.5)
GLUCOSE SERPL-MCNC: 101 MG/DL (ref 65–99)
POTASSIUM SERPL-SCNC: 3.9 MMOL/L (ref 3.6–5.5)
PROLACTIN SERPL-MCNC: 11.9 NG/ML (ref 2.8–26)
PROT SERPL-MCNC: 7.2 G/DL (ref 6–8.2)
SODIUM SERPL-SCNC: 140 MMOL/L (ref 135–145)
T4 FREE SERPL-MCNC: 1.24 NG/DL (ref 0.93–1.7)
TSH SERPL DL<=0.005 MIU/L-ACNC: 2.92 UIU/ML (ref 0.38–5.33)

## 2023-12-04 ENCOUNTER — OFFICE VISIT (OUTPATIENT)
Dept: ENDOCRINOLOGY | Facility: MEDICAL CENTER | Age: 43
End: 2023-12-04
Attending: INTERNAL MEDICINE
Payer: COMMERCIAL

## 2023-12-04 VITALS
OXYGEN SATURATION: 98 % | SYSTOLIC BLOOD PRESSURE: 120 MMHG | BODY MASS INDEX: 27.33 KG/M2 | DIASTOLIC BLOOD PRESSURE: 64 MMHG | WEIGHT: 148.5 LBS | HEIGHT: 62 IN | RESPIRATION RATE: 18 BRPM | HEART RATE: 91 BPM

## 2023-12-04 DIAGNOSIS — E55.9 VITAMIN D DEFICIENCY: ICD-10-CM

## 2023-12-04 DIAGNOSIS — R10.2 PELVIC PAIN: ICD-10-CM

## 2023-12-04 DIAGNOSIS — E06.3 HASHIMOTO'S THYROIDITIS: ICD-10-CM

## 2023-12-04 DIAGNOSIS — E22.1 HYPERPROLACTINEMIA (HCC): ICD-10-CM

## 2023-12-04 DIAGNOSIS — E03.9 ACQUIRED HYPOTHYROIDISM: ICD-10-CM

## 2023-12-04 PROCEDURE — 3074F SYST BP LT 130 MM HG: CPT | Performed by: INTERNAL MEDICINE

## 2023-12-04 PROCEDURE — 99214 OFFICE O/P EST MOD 30 MIN: CPT | Performed by: INTERNAL MEDICINE

## 2023-12-04 PROCEDURE — 3078F DIAST BP <80 MM HG: CPT | Performed by: INTERNAL MEDICINE

## 2023-12-04 PROCEDURE — 99212 OFFICE O/P EST SF 10 MIN: CPT | Performed by: INTERNAL MEDICINE

## 2023-12-04 RX ORDER — LEVOTHYROXINE SODIUM 100 MCG
100 TABLET ORAL
Qty: 90 TABLET | Refills: 2 | Status: SHIPPED | OUTPATIENT
Start: 2023-12-04

## 2023-12-04 NOTE — PROGRESS NOTES
Chief Complaint: Follow up for Primary Hypothyroidism secondary to Hashimoto's thyroiditis, idiopathic hyperprolactinemia,    HPI:     Yun Clements is a 43 y.o. female here for follow up of the above medical issue    She has a history of hyperprolactinemia with baseline prolactin levels of greater than 70 diagnosed in [id number] with baseline MRI in 2019 showing no pituitary growth or abnormality.  She was symptomatic with irregular cycles, HAs and bilateral galactorrhea         She is currently on cabergoline 0.5 mg 1/2 tablet twice a week which has been her medication for over 6 months.    She reports regular menstrual cycles  She denies breast swelling and breast tenderness   She denies nipple discharge.    She denies  blurring of vision.  She reports HAs controlled with Tylenol  She reports right sided pelvic pain   She had a pelvic ultrasound ordered by her OB/GYN which showed no ovarian masses but there was a small uterine fibroid.  She reports that her right-sided pelvic pain occurs every time she has.  We suspect that she has endometriosis patient does admit that she was started on oral contraceptives by her OB/GYN but she has not started medication.      Recent prolactin is 11.0 on 11/2023 which is new since I last saw her.    Recent prolactin is 8.0 on 5/2023  Previous prolactin is 5.79 on 11/2022  Previous prolactin was 4.61 on April 16, 2022  Previous prolactin was 4.28 on October 8, 2021  Previous prolactin was 5.5 on June 2021          With regards to her primary hypothyroidism she has Hashimoto's thyroiditis with baseline elevated TPO antibodies of 400 back in 2018 or 2019.  Previous TSH levels were greater than 5 at baseline.    She is currently on Synthroid 88 mcg daily.    She prefers brand-name   She reports constipation and cold intolerance  She denies palpitations and tremors  She no longer hair loss  TSH is 2.9 on 11/2023   TSH was 2.2 on 5/2023  TSH was 1.16 on 11/2022  TSH wast 0.63  on April 16, 2022  TSH was 0.58 with a free T4 of 1.3 on October 2021        She also has a history of hair loss that was confirmed to be secondary to iron deficiency  Her iron saturation was low at 13% ferritin is low at 19 on October 2021  She reports better compliance   Her ferritin improved to 55 on 11/2022          Patient's medications, allergies, and social histories were reviewed and updated as appropriate.      ROS:     CONS:     No fever, no chills   EYES:     No diplopia, no blurry vision   CV:           No chest pain, no palpitations   PULM:     No SOB, no cough, no hemoptysis.   GI:            No nausea, no vomiting, no diarrhea, no constipation   ENDO:     No polyuria, no polydipsia, no heat intolerance, no cold intolerance       Past Medical History:  Problem List:  2022-04: Iron deficiency  2021-10: Urinary urgency  2021-10: Bilateral hip pain  2021-06: Hashimoto's thyroiditis  2021-06: Alopecia areata  2020-10: Hair loss  2020-10: Painful menstrual periods  2020-09: Acquired hypothyroidism  2020-09: Vitamin D deficiency  2018-11: Hyperprolactinemia (HCC)  2018-09: Abnormal menstrual periods  2018-09: Acute bilateral low back pain without sciatica  2018-09: Breast discharge  2018-09: Chronic tension-type headache, not intractable      Past Surgical History:  Past Surgical History:   Procedure Laterality Date    EYE SURGERY          Allergies:  Patient has no known allergies.     Social History:  Social History     Tobacco Use    Smoking status: Never    Smokeless tobacco: Never   Vaping Use    Vaping Use: Never used   Substance Use Topics    Alcohol use: Yes     Alcohol/week: 0.6 oz     Types: 1 Glasses of wine per week     Comment: occasional - wine    Drug use: No        Family History:   family history includes Diabetes in her maternal grandfather and mother.      PHYSICAL EXAM:   Vital signs: /64 (BP Location: Left arm, Patient Position: Sitting, BP Cuff Size: Adult)   Pulse 91   Resp 18   " Ht 1.575 m (5' 2\")   Wt 67.4 kg (148 lb 8 oz)   LMP 11/29/2023 (Exact Date)   SpO2 98%   BMI 27.16 kg/m²   GENERAL: Well-developed, well-nourished in no apparent distress.   EYE:  No ocular asymmetry, PERRLA  HENT: Pink, moist mucous membranes.    NECK: No thyromegaly.   CARDIOVASCULAR:  No murmurs  LUNGS: Clear breath sounds  ABDOMEN: Soft, nontender   EXTREMITIES: No clubbing, cyanosis, or edema.   NEUROLOGICAL: No gross focal motor abnormalities   LYMPH: No cervical adenopathy palpated.   SKIN: oval-shaped circumscribed nonscarred area of scalp behind right ear with no visible hair growth      Labs:  Lab Results   Component Value Date/Time    SODIUM 140 11/29/2023 07:58 AM    POTASSIUM 3.9 11/29/2023 07:58 AM    CHLORIDE 107 11/29/2023 07:58 AM    CO2 23 11/29/2023 07:58 AM    ANION 10.0 11/29/2023 07:58 AM    GLUCOSE 101 (H) 11/29/2023 07:58 AM    BUN 14 11/29/2023 07:58 AM    CREATININE 0.79 11/29/2023 07:58 AM    CALCIUM 9.1 11/29/2023 07:58 AM    ASTSGOT 19 11/29/2023 07:58 AM    ALTSGPT 21 11/29/2023 07:58 AM    TBILIRUBIN 0.6 11/29/2023 07:58 AM    ALBUMIN 4.2 11/29/2023 07:58 AM    TOTPROTEIN 7.2 11/29/2023 07:58 AM    GLOBULIN 3.0 11/29/2023 07:58 AM    AGRATIO 1.4 11/29/2023 07:58 AM       Lab Results   Component Value Date/Time    SODIUM 139 06/19/2021 0727    POTASSIUM 4.2 06/19/2021 0727    CHLORIDE 105 06/19/2021 0727    CO2 23 06/19/2021 0727    GLUCOSE 95 06/19/2021 0727    BUN 11 06/19/2021 0727    CREATININE 0.81 06/19/2021 0727    CALCIUM 9.0 06/19/2021 0727    ANION 11.0 06/19/2021 0727       Lab Results   Component Value Date/Time    CHOLSTRLTOT 139 12/17/2020 0705    TRIGLYCERIDE 84 12/17/2020 0705    HDL 54 12/17/2020 0705    LDL 68 12/17/2020 0705       Lab Results   Component Value Date/Time    TSHULTRASEN 0.130 (L) 06/19/2021 0727     Lab Results   Component Value Date/Time    FREET4 1.51 06/19/2021 0727     Lab Results   Component Value Date/Time    FREET3 2.65 06/19/2021 0727 "     No results found for: THYSTIMIG    Lab Results   Component Value Date/Time    MICROSOMALA 54.7 (H) 2020 0626         Imagin2022 8:42 AM     HISTORY/REASON FOR EXAM:  Pituitary adenoma, known or suspected  Hyperprolactinemia     TECHNIQUE/EXAM DESCRIPTION:  Sella protocol MRI with multiplanar reformats and dynamic postcontrast acquisition.     The study was performed on a [employer] Signa 1.5 Yael MRI scanner.     14 mL ProHance contrast was administered intravenously.     COMPARISON:  None.     FINDINGS:  A tiny focus of gradient echo hypointensity is noted in the left posterior temporal region involving the subcortical zone, stable from the previous examination.  Brainstem and cerebellum are normal.  The IACs, CP angles are normal.     Intracranial flow voids are normal.  A few scattered nonspecific T2 hyperintensities noted in the deep white matter. Dynamic images demonstrate normal pattern of enhancement of the pituitary gland with no evidence for a hypoenhancing or hyperenhancing lesion to suggest a microadenoma.   Pituitary gland is slightly shallow for patient's age but is otherwise within normal limits. The cavernous sinuses are normal in appearance. The pituitary infundibulum, tuber cinereum and hypothalamic regions are within normal limits. Included portions   of the orbits are within normal limits. The portions of paranasal sinuses, mastoid vessels are within normal limits.        IMPRESSION:        Normal MRI of the pituitary gland.     Nonspecific T2 hyperintensities are noted in the periventricular and deep white matter, most likely related to chronic microvascular ischemia. These are stable from the previous study.     Tiny focus of gradient echo hypointensity in the left posterior temporal region may represent a small microcalcification/cavernoma and remains stable.    ASSESSMENT/PLAN:     1. Acquired hypothyroidism  Uncontrolled  She reports constipation  TSH is suboptimal at  2.9  Adjust Synthroid to 100 mcg daily  Follow-up in 6 months with repeat TSH    2. Hashimoto's thyroiditis  This is the etiology of her hypothyroidism    3. Hyperprolactinemia (HCC)  Stable   She has idiopathic hyperprolactinemia with negative MRI  Continue  cabergoline to 1/2 of 0.5 mg twice a week  Follow-up in 6 months with repeat prolactin    4. Hair loss  This is resolved continue minoxidil       5. Pelvic pain  Unstable  Agree with starting oral contraceptives for probable endometriosis recommend return visit with OB/GYN to discuss this probable diagnosis      Return in about 6 months (around 6/4/2024).      Thank you kindly for allowing me to participate in the thyroid care plan for this patient.    Cresencio Horan MD, FACE, [facility]      CC:   Michell Pires P.A.-C.

## 2024-04-01 DIAGNOSIS — E03.9 ACQUIRED HYPOTHYROIDISM: ICD-10-CM

## 2024-04-04 RX ORDER — LEVOTHYROXINE SODIUM 100 MCG
100 TABLET ORAL
Qty: 90 TABLET | Refills: 2 | Status: SHIPPED | OUTPATIENT
Start: 2024-04-04 | End: 2024-04-09

## 2024-04-09 DIAGNOSIS — E03.9 ACQUIRED HYPOTHYROIDISM: ICD-10-CM

## 2024-04-09 RX ORDER — LEVOTHYROXINE SODIUM 100 MCG
100 TABLET ORAL
Qty: 90 TABLET | Refills: 2 | Status: SHIPPED | OUTPATIENT
Start: 2024-04-09 | End: 2024-04-25 | Stop reason: SDUPTHER

## 2024-04-25 DIAGNOSIS — E03.9 ACQUIRED HYPOTHYROIDISM: ICD-10-CM

## 2024-04-25 RX ORDER — LEVOTHYROXINE SODIUM 100 MCG
100 TABLET ORAL
Qty: 90 TABLET | Refills: 2 | Status: SHIPPED | OUTPATIENT
Start: 2024-04-25

## 2024-04-25 NOTE — TELEPHONE ENCOUNTER
Patient states insurance no longer covers brand name Synthroid. And she was requesting the levothyroxine advised by her insurance. However I informed her about [facility] and she would like to proceed with the synthroid delivers. I have provided her their phone number. Can you send a new script to [facility] please.     Thank you,

## 2024-09-26 ENCOUNTER — TELEPHONE (OUTPATIENT)
Dept: ENDOCRINOLOGY | Facility: MEDICAL CENTER | Age: 44
End: 2024-09-26
Payer: COMMERCIAL

## 2024-09-29 DIAGNOSIS — E55.9 VITAMIN D DEFICIENCY: ICD-10-CM

## 2024-09-30 RX ORDER — ERGOCALCIFEROL 1.25 MG/1
50000 CAPSULE, LIQUID FILLED ORAL
Qty: 12 CAPSULE | Refills: 3 | Status: SHIPPED | OUTPATIENT
Start: 2024-09-30

## 2024-12-06 ENCOUNTER — NON-PROVIDER VISIT (OUTPATIENT)
Dept: OCCUPATIONAL MEDICINE | Facility: CLINIC | Age: 44
End: 2024-12-06

## 2024-12-06 DIAGNOSIS — Z11.1 ENCOUNTER FOR PPD TEST: Primary | ICD-10-CM

## 2024-12-06 PROCEDURE — 86580 TB INTRADERMAL TEST: CPT

## 2024-12-09 ENCOUNTER — NON-PROVIDER VISIT (OUTPATIENT)
Dept: OCCUPATIONAL MEDICINE | Facility: CLINIC | Age: 44
End: 2024-12-09

## 2024-12-09 LAB — TB WHEAL 3D P 5 TU DIAM: NORMAL MM

## 2024-12-16 ENCOUNTER — TELEPHONE (OUTPATIENT)
Dept: ENDOCRINOLOGY | Facility: MEDICAL CENTER | Age: 44
End: 2024-12-16
Payer: COMMERCIAL

## 2024-12-16 DIAGNOSIS — E55.9 VITAMIN D DEFICIENCY: ICD-10-CM

## 2024-12-16 DIAGNOSIS — E06.3 HASHIMOTO'S THYROIDITIS: ICD-10-CM

## 2024-12-16 DIAGNOSIS — E03.9 ACQUIRED HYPOTHYROIDISM: ICD-10-CM

## 2024-12-16 DIAGNOSIS — E22.1 HYPERPROLACTINEMIA (HCC): ICD-10-CM

## 2024-12-16 NOTE — TELEPHONE ENCOUNTER
Patient called and asked if provider can order a new set of labs for her upcoming apt. I advised patient I would let her provider know.

## 2024-12-19 ENCOUNTER — APPOINTMENT (OUTPATIENT)
Dept: URBAN - METROPOLITAN AREA CLINIC 6 | Facility: CLINIC | Age: 44
Setting detail: DERMATOLOGY
End: 2024-12-19

## 2024-12-19 ENCOUNTER — HOSPITAL ENCOUNTER (OUTPATIENT)
Dept: LAB | Facility: MEDICAL CENTER | Age: 44
End: 2024-12-19
Attending: INTERNAL MEDICINE
Payer: COMMERCIAL

## 2024-12-19 DIAGNOSIS — E22.1 HYPERPROLACTINEMIA (HCC): ICD-10-CM

## 2024-12-19 DIAGNOSIS — E06.3 HASHIMOTO'S THYROIDITIS: ICD-10-CM

## 2024-12-19 DIAGNOSIS — E03.9 ACQUIRED HYPOTHYROIDISM: ICD-10-CM

## 2024-12-19 DIAGNOSIS — E55.9 VITAMIN D DEFICIENCY: ICD-10-CM

## 2024-12-19 DIAGNOSIS — L65.0 TELOGEN EFFLUVIUM: ICD-10-CM

## 2024-12-19 PROBLEM — L65.9 NONSCARRING HAIR LOSS, UNSPECIFIED: Status: ACTIVE | Noted: 2024-12-19

## 2024-12-19 LAB
25(OH)D3 SERPL-MCNC: 53 NG/ML (ref 30–100)
ALBUMIN SERPL BCP-MCNC: 4.4 G/DL (ref 3.2–4.9)
ALBUMIN/GLOB SERPL: 1.8 G/DL
ALP SERPL-CCNC: 78 U/L (ref 30–99)
ALT SERPL-CCNC: 22 U/L (ref 2–50)
ANION GAP SERPL CALC-SCNC: 10 MMOL/L (ref 7–16)
AST SERPL-CCNC: 24 U/L (ref 12–45)
BILIRUB SERPL-MCNC: 0.5 MG/DL (ref 0.1–1.5)
BUN SERPL-MCNC: 12 MG/DL (ref 8–22)
CALCIUM ALBUM COR SERPL-MCNC: 8.7 MG/DL (ref 8.5–10.5)
CALCIUM SERPL-MCNC: 9 MG/DL (ref 8.5–10.5)
CHLORIDE SERPL-SCNC: 108 MMOL/L (ref 96–112)
CO2 SERPL-SCNC: 24 MMOL/L (ref 20–33)
CREAT SERPL-MCNC: 0.67 MG/DL (ref 0.5–1.4)
GFR SERPLBLD CREATININE-BSD FMLA CKD-EPI: 111 ML/MIN/1.73 M 2
GLOBULIN SER CALC-MCNC: 2.5 G/DL (ref 1.9–3.5)
GLUCOSE SERPL-MCNC: 82 MG/DL (ref 65–99)
POTASSIUM SERPL-SCNC: 4.2 MMOL/L (ref 3.6–5.5)
PROLACTIN SERPL-MCNC: 11.5 NG/ML (ref 2.8–26)
PROT SERPL-MCNC: 6.9 G/DL (ref 6–8.2)
SODIUM SERPL-SCNC: 142 MMOL/L (ref 135–145)
T4 FREE SERPL-MCNC: 1.48 NG/DL (ref 0.93–1.7)
TSH SERPL-ACNC: 0.49 UIU/ML (ref 0.35–5.5)

## 2024-12-19 PROCEDURE — 99214 OFFICE O/P EST MOD 30 MIN: CPT

## 2024-12-19 PROCEDURE — 84439 ASSAY OF FREE THYROXINE: CPT

## 2024-12-19 PROCEDURE — 80053 COMPREHEN METABOLIC PANEL: CPT

## 2024-12-19 PROCEDURE — ? PRESCRIPTION

## 2024-12-19 PROCEDURE — 82306 VITAMIN D 25 HYDROXY: CPT

## 2024-12-19 PROCEDURE — ? COUNSELING

## 2024-12-19 PROCEDURE — ? OTC TREATMENT REGIMEN

## 2024-12-19 PROCEDURE — ? PRESCRIPTION MEDICATION MANAGEMENT

## 2024-12-19 PROCEDURE — 84146 ASSAY OF PROLACTIN: CPT

## 2024-12-19 PROCEDURE — 36415 COLL VENOUS BLD VENIPUNCTURE: CPT

## 2024-12-19 PROCEDURE — ? DIAGNOSIS COMMENT

## 2024-12-19 PROCEDURE — 84443 ASSAY THYROID STIM HORMONE: CPT

## 2024-12-19 RX ORDER — SPIRONOLACTONE 50 MG/1
1 TABLET, FILM COATED ORAL BID
Qty: 180 | Refills: 1 | Status: ERX | COMMUNITY
Start: 2024-12-19

## 2024-12-19 RX ADMIN — SPIRONOLACTONE 1: 50 TABLET, FILM COATED ORAL at 00:00

## 2024-12-19 ASSESSMENT — LOCATION DETAILED DESCRIPTION DERM
LOCATION DETAILED: RIGHT MEDIAL FRONTAL SCALP
LOCATION DETAILED: LEFT SUPERIOR PARIETAL SCALP
LOCATION DETAILED: RIGHT SUPERIOR PARIETAL SCALP

## 2024-12-19 ASSESSMENT — LOCATION SIMPLE DESCRIPTION DERM
LOCATION SIMPLE: RIGHT SCALP
LOCATION SIMPLE: SCALP

## 2024-12-19 ASSESSMENT — LOCATION ZONE DERM: LOCATION ZONE: SCALP

## 2024-12-19 NOTE — PROCEDURE: PRESCRIPTION MEDICATION MANAGEMENT
Render In Strict Bullet Format?: No
Detail Level: Zone
Initiate Treatment: spironolactone 50 mg tablet BID

## 2024-12-19 NOTE — PROCEDURE: DIAGNOSIS COMMENT
Comment: Pt reports sudden shedding starting 3 months ago. She had a similar episode of hair loss 2 years ago that normalized without treatment. \\nNo family history of hair loss. \\nPituitary/Thyroid problems. Currently on Synthroid-Euthyroid.\\nShe denies any preceding illness, stress, trauma, or surgeries over the past 6 months. \\nDiscussed this sounds most consistent with TE, although without preceding event it could also be pattern Alopecia. She recently had thyroid and vitamin D checked today through PCP, still pending results. \\nStates iron wasn’t included in order but she is currently taking iron supplements. \\nWill start Spironolactone 100 mg daily and follow up in 3 months.
Detail Level: Simple
Render Risk Assessment In Note?: no

## 2024-12-19 NOTE — PROCEDURE: OTC TREATMENT REGIMEN
Patient Specific Otc Recommendations (Will Not Stick From Patient To Patient): Minoxidil 5% foam \\nNutrafol supplements
Detail Level: Zone

## 2025-01-17 ENCOUNTER — HOSPITAL ENCOUNTER (OUTPATIENT)
Facility: MEDICAL CENTER | Age: 45
End: 2025-01-17
Attending: NURSE PRACTITIONER
Payer: COMMERCIAL

## 2025-01-17 ENCOUNTER — OFFICE VISIT (OUTPATIENT)
Dept: MEDICAL GROUP | Facility: MEDICAL CENTER | Age: 45
End: 2025-01-17
Payer: COMMERCIAL

## 2025-01-17 VITALS
DIASTOLIC BLOOD PRESSURE: 68 MMHG | TEMPERATURE: 97.6 F | SYSTOLIC BLOOD PRESSURE: 124 MMHG | BODY MASS INDEX: 25.21 KG/M2 | OXYGEN SATURATION: 97 % | HEART RATE: 62 BPM | HEIGHT: 62 IN | WEIGHT: 137 LBS | RESPIRATION RATE: 16 BRPM

## 2025-01-17 DIAGNOSIS — Z01.419 WELL WOMAN EXAM WITH ROUTINE GYNECOLOGICAL EXAM: ICD-10-CM

## 2025-01-17 DIAGNOSIS — N94.6 DYSMENORRHEA: ICD-10-CM

## 2025-01-17 DIAGNOSIS — D25.9 UTERINE LEIOMYOMA, UNSPECIFIED LOCATION: ICD-10-CM

## 2025-01-17 DIAGNOSIS — Z12.31 ENCOUNTER FOR SCREENING MAMMOGRAM FOR BREAST CANCER: ICD-10-CM

## 2025-01-17 DIAGNOSIS — N80.03 ADENOMYOSIS: ICD-10-CM

## 2025-01-17 DIAGNOSIS — Z12.4 SCREENING FOR MALIGNANT NEOPLASM OF CERVIX: ICD-10-CM

## 2025-01-17 DIAGNOSIS — Z11.51 SCREENING FOR HPV (HUMAN PAPILLOMAVIRUS): ICD-10-CM

## 2025-01-17 PROCEDURE — 3078F DIAST BP <80 MM HG: CPT | Performed by: NURSE PRACTITIONER

## 2025-01-17 PROCEDURE — 88142 CYTOPATH C/V THIN LAYER: CPT

## 2025-01-17 PROCEDURE — 3074F SYST BP LT 130 MM HG: CPT | Performed by: NURSE PRACTITIONER

## 2025-01-17 PROCEDURE — 99396 PREV VISIT EST AGE 40-64: CPT | Performed by: NURSE PRACTITIONER

## 2025-01-17 PROCEDURE — 87624 HPV HI-RISK TYP POOLED RSLT: CPT

## 2025-01-17 PROCEDURE — 87491 CHLMYD TRACH DNA AMP PROBE: CPT

## 2025-01-17 PROCEDURE — 87591 N.GONORRHOEAE DNA AMP PROB: CPT

## 2025-01-17 RX ORDER — SPIRONOLACTONE 50 MG/1
50 TABLET, FILM COATED ORAL 2 TIMES DAILY
COMMUNITY
Start: 2024-12-19

## 2025-01-17 RX ORDER — NAPROXEN 500 MG/1
TABLET ORAL
Qty: 60 TABLET | Refills: 6 | Status: SHIPPED | OUTPATIENT
Start: 2025-01-17

## 2025-01-17 ASSESSMENT — PATIENT HEALTH QUESTIONNAIRE - PHQ9: CLINICAL INTERPRETATION OF PHQ2 SCORE: 0

## 2025-01-17 NOTE — PROGRESS NOTES
Subjective:     CC:   Chief Complaint   Patient presents with    Annual Exam     HPI:   Yun Clements is a 44 y.o. female who presents for Routine Preventative Exam    Verbal consent was acquired by the patient to use [employer] ambient listening note generation during this visit Yes   History of Present Illness  The patient presents for a Pap smear, right-sided menstrual pain, constipation.    Her last Pap smear was conducted in 2023, during which an abnormality was detected. This led to a consultation with a gynecologist and subsequent ultrasound, revealing a small fibroid on the right side. She experiences pain on the right side during her menstrual cycle, which began approximately 3 years ago. The pain radiates down her leg but does not cause leg swelling. Her menstrual cycles are regular, characterized by the presence of blood clots. She has been prescribed birth control pills but is hesitant to take them due to her current medication load.  She maintains a healthy lifestyle, including regular exercise and a balanced diet, and is currently under the care of a nutritionist. She has been adhering to a specific diet for the past month, which has significantly improved her constipation. She is up to date on her eye exams and dental cleanings. She had a mammogram in 2023. Her last period was on 01/10/2025. She is using condoms for birth control. She is in a safe relationship. She manages with Advil.    FAMILY HISTORY  Her mother and maternal grandfather have diabetes. Her sister has endometriosis. No family history of heart attack or stroke.    Last Pap Smear: 6/2023  H/O Abnormal Pap: Yes  Last Mammogram: 2023-DUE  Last Bone Density Test: n/a  Last Colorectal Cancer Screening: n/a  Last Tdap: 11/2019  Received HPV series: No      Up to date on Eye Exam:  Yes  Up to date on Dental Cleanings:  Yes     Exercise: not regular exercise routine.   Diet: regular       Patient's last menstrual period was  01/10/2025.  Hx STDs: Yes, HPV  Birth control: Condoms  No significant bloating/fluid retention, pelvic pain, or dyspareunia. No abnormal vaginal discharge.  No breast tenderness, mass, nipple discharge, changes in size or contour, or abnormal cyclic discomfort.    OB History    Para Term  AB Living   2 2 0 0 0 0   SAB IAB Ectopic Molar Multiple Live Births   0 0 0 0 0 0      She  has no history on file for sexual activity.    She  has a past medical history of Thyroid disease.  She  has a past surgical history that includes eye surgery.    Family History   Problem Relation Age of Onset    Diabetes Mother     Diabetes Maternal Grandfather     Cancer Neg Hx     Heart Attack Neg Hx     Heart Disease Neg Hx     Stroke Neg Hx      Social History     Tobacco Use    Smoking status: Never    Smokeless tobacco: Never   Vaping Use    Vaping status: Never Used   Substance Use Topics    Alcohol use: Yes     Alcohol/week: 0.6 oz     Types: 1 Glasses of wine per week     Comment: occasional - wine    Drug use: No       Patient Active Problem List    Diagnosis Date Noted    Iron deficiency 2022    Urinary urgency 10/20/2021    Bilateral hip pain 10/20/2021    Hashimoto's thyroiditis 2021    Alopecia areata 2021    Hair loss 10/14/2020    Painful menstrual periods 10/14/2020    Acquired hypothyroidism 2020    Vitamin D deficiency 2020    Hyperprolactinemia (HCC) 2018    Chronic tension-type headache, not intractable 2018     Current Outpatient Medications   Medication Sig Dispense Refill    spironolactone (ALDACTONE) 50 MG Tab Take 50 mg by mouth 2 times a day.      naproxen (NAPROSYN) 500 MG Tab Take 500 mg by mouth at the first sign of menses; may repeat 500 mg 3 to 5 hours later, and continue 500 mg twice daily for 2 to 3 days or until cessation of bleeding; maximum dose: 1g per day. 60 Tablet 6    vitamin D2, Ergocalciferol, (DRISDOL) 1.25 MG (76712 UT) Cap capsule TAKE 1  "CAPSULE BY MOUTH EVERY 7 DAYS. WITH FOOD. 12 Capsule 3    SYNTHROID 100 MCG Tab Take 1 Tablet by mouth every morning on an empty stomach. 90 Tablet 2    norethindrone (MICRONOR) 0.35 MG tablet Take 1 Tablet by mouth every day. 84 Tablet 3    cabergoline (DOSTINEX) 0.5 MG tablet TAKE 0.5 TABLETS BY MOUTH EVERY 72 HOURS. ONE HALF A TAB TWO TIMES A WEEK. INDICATIONS: DISORDER WITH ELEVATED LEVELS OF PROLACTIN IN THE BLOOD 72 Tablet 2    ibuprofen (MOTRIN) 800 MG Tab Take 1 Tab by mouth every 8 hours as needed. 20 Tab 3     No current facility-administered medications for this visit.     No Known Allergies    Review of Systems   Constitutional: Negative for fever, chills and malaise/fatigue.   HENT: Negative for congestion.    Eyes: Negative for pain.   Respiratory: Negative for cough and shortness of breath.    Cardiovascular: Negative for chest pain and leg swelling.   Gastrointestinal: Negative for nausea, vomiting, abdominal pain and diarrhea.   Genitourinary: Negative for dysuria and hematuria.   Skin: Negative for rash.   Neurological: Negative for dizziness, focal weakness and headaches.   Endo/Heme/Allergies: Does not bruise/bleed easily.   Psychiatric/Behavioral: Negative for depression.  The patient is not nervous/anxious.      Objective:   /68   Pulse 62   Temp 36.4 °C (97.6 °F) (Temporal)   Resp 16   Ht 1.575 m (5' 2\")   Wt 62.1 kg (137 lb)   LMP 01/10/2025   SpO2 97%   BMI 25.06 kg/m²     Wt Readings from Last 4 Encounters:   01/17/25 62.1 kg (137 lb)   12/04/23 67.4 kg (148 lb 8 oz)   10/23/23 66.2 kg (146 lb)   06/27/23 66.2 kg (146 lb)       A chaperone was offered to the patient during today's exam. Patient declined chaperone.    Physical Exam:  Constitutional: Well-developed and well-nourished. Not diaphoretic. No distress.   Skin: Skin is warm and dry. No rash noted.  Head: Atraumatic without lesions.  Eyes: Conjunctivae and extraocular motions are normal. Pupils are equal, round, and " reactive to light. No scleral icterus.   Ears:  External ears unremarkable.  Nose: Nares patent. Septum midline.  Neck: Supple, trachea midline. Normal range of motion. No thyromegaly present. No lymphadenopathy--cervical or supraclavicular.  Cardiovascular: Regular rate and rhythm, S1 and S2 without murmur, rubs, or gallops.    Respiratory: Effort normal. Clear to auscultation throughout. No adventitious sounds.   Abdomen: Soft, non tender, and without distention. Active bowel sounds in all four quadrants. No rebound, guarding, masses or HSM.  : Perineum and external genitalia normal without rash. Vagina with normal and physiologic, scant, and clear discharge. Cervix without visible lesions or discharge. Bimanual exam without adnexal masses or cervical motion tenderness.  Extremities: No cyanosis, clubbing, erythema, nor edema. Distal pulses intact and symmetric.   Musculoskeletal: All major joints AROM full in all directions without pain.  Neurological: Alert and oriented x 3. Grossly non-focal.   Psychiatric:  Behavior, mood, and affect are appropriate.    Assessment and Plan:     1. Well woman exam with routine gynecological exam  - THINPREP PAP W/HPV AND CTNG; Future    2. Screening for malignant neoplasm of cervix  - THINPREP PAP W/HPV AND CTNG; Future    3. Screening for HPV (human papillomavirus)  - THINPREP PAP W/HPV AND CTNG; Future    4. Encounter for screening mammogram for breast cancer  - MA-SCREENING MAMMO BILAT W/TOMOSYNTHESIS W/CAD; Future    5. Dysmenorrhea  - naproxen (NAPROSYN) 500 MG Tab; Take 500 mg by mouth at the first sign of menses; may repeat 500 mg 3 to 5 hours later, and continue 500 mg twice daily for 2 to 3 days or until cessation of bleeding; maximum dose: 1g per day.  Dispense: 60 Tablet; Refill: 6    6. Adenomyosis    7. Uterine leiomyoma, unspecified location    Other orders  - spironolactone (ALDACTONE) 50 MG Tab; Take 50 mg by mouth 2 times a day.    Assessment & Plan  1.  Right-sided menstrual pain - The pain could potentially be attributed to Mittelschmerz dysmenorrhea, endometriosis, or pelvic congestion syndrome. The presence of a uterine fibroid may also be a contributing factor.  - A prescription for naproxen has been issued to [facility].  - She has been advised to commence the medication 1 to 2 days prior to the onset of her menstrual cycle.  - Concurrent use of Motrin is not recommended.  - She has been encouraged to maintain a healthy diet, particularly during her menstrual period, to mitigate inflammation.  - She has been instructed to monitor her symptoms closely and report any changes.  - Should her symptoms worsen, another ultrasound will be conducted for further evaluation.    2. Health Maintenance - A mammogram has been ordered. She is up to date on her eye exams and dental cleanings. She declined the influenza vaccine.       Health maintenance:  -Labs per orders-  -Immunizations per orders-  -Patient counseled about skin care, diet, supplements, and exercise, self care.   -Discussed  breast self exam, mammography screening, adequate intake of calcium and vitamin D, diet and exercise     Smoking: recommend complete avoidance of all tobacco products  Alcohol: recommend limiting consumption  Physical Activity: goal is 30 min of moderate activity 5 times a week  Weight Management and Nutrition: high vegetable, high protein diet like the Mediterranean diet, portion control, avoid excessive sugars.    Follow-up: Return for Annual Preventative Exam.    Marilia VILLANUEVA

## 2025-01-18 LAB
C TRACH DNA GENITAL QL NAA+PROBE: NEGATIVE
N GONORRHOEA DNA GENITAL QL NAA+PROBE: NEGATIVE
SPECIMEN SOURCE: NORMAL

## 2025-01-23 ENCOUNTER — OFFICE VISIT (OUTPATIENT)
Dept: ENDOCRINOLOGY | Facility: MEDICAL CENTER | Age: 45
End: 2025-01-23
Attending: INTERNAL MEDICINE
Payer: COMMERCIAL

## 2025-01-23 VITALS
DIASTOLIC BLOOD PRESSURE: 64 MMHG | WEIGHT: 141 LBS | HEIGHT: 62 IN | OXYGEN SATURATION: 96 % | SYSTOLIC BLOOD PRESSURE: 128 MMHG | BODY MASS INDEX: 25.95 KG/M2 | HEART RATE: 77 BPM

## 2025-01-23 DIAGNOSIS — E22.1 HYPERPROLACTINEMIA (HCC): ICD-10-CM

## 2025-01-23 DIAGNOSIS — E06.3 HASHIMOTO'S THYROIDITIS: ICD-10-CM

## 2025-01-23 DIAGNOSIS — E61.1 IRON DEFICIENCY: ICD-10-CM

## 2025-01-23 DIAGNOSIS — E55.9 VITAMIN D DEFICIENCY: ICD-10-CM

## 2025-01-23 DIAGNOSIS — E03.9 ACQUIRED HYPOTHYROIDISM: ICD-10-CM

## 2025-01-23 LAB
HPV I/H RISK 1 DNA SPEC QL NAA+PROBE: NOT DETECTED
SPECIMEN SOURCE: NORMAL
THINPREP PAP, CYTOLOGY NL11781: NORMAL

## 2025-01-23 PROCEDURE — 99211 OFF/OP EST MAY X REQ PHY/QHP: CPT | Performed by: INTERNAL MEDICINE

## 2025-01-23 PROCEDURE — 3074F SYST BP LT 130 MM HG: CPT | Performed by: INTERNAL MEDICINE

## 2025-01-23 PROCEDURE — 99215 OFFICE O/P EST HI 40 MIN: CPT | Performed by: INTERNAL MEDICINE

## 2025-01-23 PROCEDURE — 3078F DIAST BP <80 MM HG: CPT | Performed by: INTERNAL MEDICINE

## 2025-01-23 RX ORDER — LEVOTHYROXINE SODIUM 100 MCG
100 TABLET ORAL
Qty: 90 TABLET | Refills: 2 | Status: SHIPPED | OUTPATIENT
Start: 2025-01-23

## 2025-01-23 RX ORDER — ERGOCALCIFEROL 1.25 MG/1
50000 CAPSULE, LIQUID FILLED ORAL
Qty: 12 CAPSULE | Refills: 3 | Status: SHIPPED | OUTPATIENT
Start: 2025-01-23

## 2025-01-23 RX ORDER — CABERGOLINE 0.5 MG/1
0.25 TABLET ORAL
Qty: 12 TABLET | Refills: 1 | Status: SHIPPED | OUTPATIENT
Start: 2025-01-23

## 2025-01-24 NOTE — PROGRESS NOTES
Chief Complaint: Follow up for Primary Hypothyroidism secondary to Hashimoto's thyroiditis, idiopathic hyperprolactinemia,    HPI:     Yun Clements is a 44 y.o. female here for follow up of the above medical issue    She has a history of hyperprolactinemia with baseline prolactin levels of greater than 70 diagnosed in [id number] with baseline MRI in 2019 showing no pituitary growth or abnormality.  She was symptomatic with irregular cycles, HAs and bilateral galactorrhea         She is currently on cabergoline 0.5 mg 1/2 tablet twice a week which has been her medication for over 12-18 months.    She reports regular menstrual cycles  She denies breast swelling and breast tenderness   She denies nipple discharge.    She denies  blurring of vision.  She denies headaches        Recent prolactin is 11.0 on 12/2024  Previous  prolactin is 11.0 on 11/2023  Previous prolactin is 8.0 on 5/2023  Previous prolactin is 5.79 on 11/2022  Previous prolactin was 4.61 on April 16, 2022  Previous prolactin was 4.28 on October 8, 2021  Previous prolactin was 5.5 on June 2021          With regards to her primary hypothyroidism she has Hashimoto's thyroiditis with baseline elevated TPO antibodies of 400 back in 2018 or 2019.  Previous TSH levels were greater than 5 at baseline.    She is currently on Synthroid 100 mcg daily.    She prefers brand-name   She reports constipation and cold intolerance  She denies palpitations and tremors    TSH is 0.491 on 12/204 --- 100mcg  TSH is 2.9 on 11/2023 --- 88 mcg  TSH was 2.2 on 5/2023  TSH was 1.16 on 11/2022  TSH wast 0.63 on April 16, 2022  TSH was 0.58 with a free T4 of 1.3 on October 2021        She also has a history of hair loss that was confirmed to be secondary to iron deficiency  Her iron saturation was low at 13% ferritin is low at 19 on October 2021  She reports better compliance   Her ferritin improved to 55 on 11/2022    She started having hair loss again and a dermatologist  "gave her spironolactone         Patient's medications, allergies, and social histories were reviewed and updated as appropriate.      ROS:     CONS:     No fever, no chills   EYES:     No diplopia, no blurry vision   CV:           No chest pain, no palpitations   PULM:     No SOB, no cough, no hemoptysis.   GI:            No nausea, no vomiting, no diarrhea, no constipation   ENDO:     No polyuria, no polydipsia, no heat intolerance, no cold intolerance       Past Medical History:  Problem List:  2022-04: Iron deficiency  2021-10: Urinary urgency  2021-10: Bilateral hip pain  2021-06: Hashimoto's thyroiditis  2021-06: Alopecia areata  2020-10: Hair loss  2020-10: Painful menstrual periods  2020-09: Acquired hypothyroidism  2020-09: Vitamin D deficiency  2018-11: Hyperprolactinemia (HCC)  2018-09: Abnormal menstrual periods  2018-09: Acute bilateral low back pain without sciatica  2018-09: Breast discharge  2018-09: Chronic tension-type headache, not intractable      Past Surgical History:  Past Surgical History:   Procedure Laterality Date    EYE SURGERY          Allergies:  Patient has no known allergies.     Social History:  Social History     Tobacco Use    Smoking status: Never    Smokeless tobacco: Never   Vaping Use    Vaping status: Never Used   Substance Use Topics    Alcohol use: Yes     Alcohol/week: 0.6 oz     Types: 1 Glasses of wine per week     Comment: occasional - wine    Drug use: No        Family History:   family history includes Diabetes in her maternal grandfather and mother.      PHYSICAL EXAM:   Vital signs: /64 (BP Location: Left arm, Patient Position: Sitting, BP Cuff Size: Adult)   Pulse 77   Ht 1.575 m (5' 2\")   Wt 64 kg (141 lb)   LMP 01/10/2025   SpO2 96%   BMI 25.79 kg/m²   GENERAL: Well-developed, well-nourished in no apparent distress.   EYE:  No ocular asymmetry, PERRLA  HENT: Pink, moist mucous membranes.    NECK: No thyromegaly.   CARDIOVASCULAR:  No murmurs  LUNGS: " Clear breath sounds  ABDOMEN: Soft, nontender   EXTREMITIES: No clubbing, cyanosis, or edema.   NEUROLOGICAL: No gross focal motor abnormalities   LYMPH: No cervical adenopathy palpated.   SKIN: oval-shaped circumscribed nonscarred area of scalp behind right ear with no visible hair growth      Labs:  Lab Results   Component Value Date/Time    SODIUM 142 2024 11:23 AM    POTASSIUM 4.2 2024 11:23 AM    CHLORIDE 108 2024 11:23 AM    CO2 24 2024 11:23 AM    ANION 10.0 2024 11:23 AM    GLUCOSE 82 2024 11:23 AM    BUN 12 2024 11:23 AM    CREATININE 0.67 2024 11:23 AM    CALCIUM 9.0 2024 11:23 AM    ASTSGOT 24 2024 11:23 AM    ALTSGPT 22 2024 11:23 AM    TBILIRUBIN 0.5 2024 11:23 AM    ALBUMIN 4.4 2024 11:23 AM    TOTPROTEIN 6.9 2024 11:23 AM    GLOBULIN 2.5 2024 11:23 AM    AGRATIO 1.8 2024 11:23 AM       Lab Results   Component Value Date/Time    SODIUM 139 2021 0727    POTASSIUM 4.2 2021 0727    CHLORIDE 105 2021 0727    CO2 23 2021 0727    GLUCOSE 95 2021 0727    BUN 11 2021 0727    CREATININE 0.81 2021 0727    CALCIUM 9.0 2021 0727    ANION 11.0 2021 0727       Lab Results   Component Value Date/Time    CHOLSTRLTOT 139 2020 0705    TRIGLYCERIDE 84 2020 0705    HDL 54 2020 0705    LDL 68 2020 0705       Lab Results   Component Value Date/Time    TSHULTRASEN 0.130 (L) 2021 0727     Lab Results   Component Value Date/Time    FREET4 1.51 2021 0727     Lab Results   Component Value Date/Time    FREET3 2.65 2021 0727     No results found for: THYSTIMIG    Lab Results   Component Value Date/Time    MICROSOMALA 54.7 (H) 2020 0626         Imagin2022 8:42 AM     HISTORY/REASON FOR EXAM:  Pituitary adenoma, known or suspected  Hyperprolactinemia     TECHNIQUE/EXAM DESCRIPTION:  Sella protocol MRI with multiplanar reformats  and dynamic postcontrast acquisition.     The study was performed on a [employer] Signa 1.5 Yael MRI scanner.     14 mL ProHance contrast was administered intravenously.     COMPARISON:  None.     FINDINGS:  A tiny focus of gradient echo hypointensity is noted in the left posterior temporal region involving the subcortical zone, stable from the previous examination.  Brainstem and cerebellum are normal.  The IACs, CP angles are normal.     Intracranial flow voids are normal.  A few scattered nonspecific T2 hyperintensities noted in the deep white matter. Dynamic images demonstrate normal pattern of enhancement of the pituitary gland with no evidence for a hypoenhancing or hyperenhancing lesion to suggest a microadenoma.   Pituitary gland is slightly shallow for patient's age but is otherwise within normal limits. The cavernous sinuses are normal in appearance. The pituitary infundibulum, tuber cinereum and hypothalamic regions are within normal limits. Included portions   of the orbits are within normal limits. The portions of paranasal sinuses, mastoid vessels are within normal limits.        IMPRESSION:        Normal MRI of the pituitary gland.     Nonspecific T2 hyperintensities are noted in the periventricular and deep white matter, most likely related to chronic microvascular ischemia. These are stable from the previous study.     Tiny focus of gradient echo hypointensity in the left posterior temporal region may represent a small microcalcification/cavernoma and remains stable.    ASSESSMENT/PLAN:     1. Acquired hypothyroidism  Controlled  TSH is optimal  Continue Synthroid to 100 mcg daily  Follow-up in 6 months with repeat TSH    2. Hashimoto's thyroiditis  This is the etiology of her hypothyroidism    3. Hyperprolactinemia (HCC)  Stable   She has idiopathic hyperprolactinemia with negative MRI  I am getting another pituitary MRI this year  If this is negative I want her to reduce cabergoline to 1/2 tablet once  a week and follow-up with repeat prolactin levels afterwards will try to wean her off cabergoline completely  Follow-up in 6 months with repeat prolactin    4. Hair loss  Unstable she reports hair loss again and is now on spironolactone from dermatology  She is also using minoxidil  I am checking her iron and ferritin levels with her next labs    5.  Vitamin D  Well-controlled  Continue ergocalciferol  Continue monitoring 25-hydroxy vitamin D levels      Return in about 7 months (around 8/23/2025).      Total time spent on day of service was over 60 minutes which included obtaining a detailed history and physical exam, ordering labs, coordinating care and scheduling future follow-up    Thank you kindly for allowing me to participate in the thyroid care plan for this patient.    Cresencio Horan MD, FACE, [facility]      CC:   Michell Pires P.A.-C.

## 2025-07-21 ENCOUNTER — APPOINTMENT (OUTPATIENT)
Dept: LAB | Facility: MEDICAL CENTER | Age: 45
End: 2025-07-21

## 2025-08-05 ENCOUNTER — TELEPHONE (OUTPATIENT)
Dept: ENDOCRINOLOGY | Facility: MEDICAL CENTER | Age: 45
End: 2025-08-05
Payer: COMMERCIAL

## 2025-08-05 ENCOUNTER — TELEMEDICINE (OUTPATIENT)
Dept: ENDOCRINOLOGY | Facility: MEDICAL CENTER | Age: 45
End: 2025-08-05
Attending: INTERNAL MEDICINE

## 2025-08-05 VITALS — WEIGHT: 150 LBS | BODY MASS INDEX: 27.6 KG/M2 | HEIGHT: 62 IN

## 2025-08-05 DIAGNOSIS — E06.3 HASHIMOTO'S THYROIDITIS: ICD-10-CM

## 2025-08-05 DIAGNOSIS — E03.9 ACQUIRED HYPOTHYROIDISM: Primary | ICD-10-CM

## 2025-08-05 DIAGNOSIS — E61.1 IRON DEFICIENCY: ICD-10-CM

## 2025-08-05 DIAGNOSIS — E55.9 VITAMIN D DEFICIENCY: ICD-10-CM

## 2025-08-05 DIAGNOSIS — E22.1 HYPERPROLACTINEMIA (HCC): ICD-10-CM

## 2025-08-05 PROCEDURE — 99214 OFFICE O/P EST MOD 30 MIN: CPT | Mod: 95 | Performed by: INTERNAL MEDICINE

## 2025-08-05 RX ORDER — LEVOTHYROXINE SODIUM 100 MCG
100 TABLET ORAL
Qty: 90 TABLET | Refills: 3 | Status: SHIPPED | OUTPATIENT
Start: 2025-08-05